# Patient Record
Sex: FEMALE | Race: WHITE | NOT HISPANIC OR LATINO | Employment: OTHER | URBAN - METROPOLITAN AREA
[De-identification: names, ages, dates, MRNs, and addresses within clinical notes are randomized per-mention and may not be internally consistent; named-entity substitution may affect disease eponyms.]

---

## 2017-01-24 ENCOUNTER — TELEPHONE (OUTPATIENT)
Dept: PHARMACY | Facility: CLINIC | Age: 67
End: 2017-01-24

## 2017-02-20 ENCOUNTER — TELEPHONE (OUTPATIENT)
Dept: PHARMACY | Facility: CLINIC | Age: 67
End: 2017-02-20

## 2017-03-17 ENCOUNTER — TELEPHONE (OUTPATIENT)
Dept: PHARMACY | Facility: CLINIC | Age: 67
End: 2017-03-17

## 2017-04-11 PROBLEM — Z79.899 LONG-TERM USE OF HIGH-RISK MEDICATION: Status: ACTIVE | Noted: 2017-04-11

## 2017-04-13 ENCOUNTER — TELEPHONE (OUTPATIENT)
Dept: PHARMACY | Facility: CLINIC | Age: 67
End: 2017-04-13

## 2017-05-12 ENCOUNTER — TELEPHONE (OUTPATIENT)
Dept: PHARMACY | Facility: CLINIC | Age: 67
End: 2017-05-12

## 2017-05-17 ENCOUNTER — TELEPHONE (OUTPATIENT)
Dept: PHARMACY | Facility: CLINIC | Age: 67
End: 2017-05-17

## 2017-06-09 ENCOUNTER — TELEPHONE (OUTPATIENT)
Dept: PHARMACY | Facility: CLINIC | Age: 67
End: 2017-06-09

## 2017-06-13 ENCOUNTER — TELEPHONE (OUTPATIENT)
Dept: PHARMACY | Facility: CLINIC | Age: 67
End: 2017-06-13

## 2017-07-07 ENCOUNTER — TELEPHONE (OUTPATIENT)
Dept: PHARMACY | Facility: CLINIC | Age: 67
End: 2017-07-07

## 2017-08-03 ENCOUNTER — TELEPHONE (OUTPATIENT)
Dept: PHARMACY | Facility: CLINIC | Age: 67
End: 2017-08-03

## 2017-09-01 ENCOUNTER — TELEPHONE (OUTPATIENT)
Dept: PHARMACY | Facility: CLINIC | Age: 67
End: 2017-09-01

## 2017-10-02 ENCOUNTER — TELEPHONE (OUTPATIENT)
Dept: PHARMACY | Facility: CLINIC | Age: 67
End: 2017-10-02

## 2017-11-03 ENCOUNTER — TELEPHONE (OUTPATIENT)
Dept: PHARMACY | Facility: CLINIC | Age: 67
End: 2017-11-03

## 2017-11-30 ENCOUNTER — TELEPHONE (OUTPATIENT)
Dept: PHARMACY | Facility: CLINIC | Age: 67
End: 2017-11-30

## 2017-12-26 ENCOUNTER — TELEPHONE (OUTPATIENT)
Dept: PHARMACY | Facility: CLINIC | Age: 67
End: 2017-12-26

## 2018-01-22 ENCOUNTER — TELEPHONE (OUTPATIENT)
Dept: PHARMACY | Facility: CLINIC | Age: 68
End: 2018-01-22

## 2018-02-19 ENCOUNTER — TELEPHONE (OUTPATIENT)
Dept: PHARMACY | Facility: CLINIC | Age: 68
End: 2018-02-19

## 2018-03-20 ENCOUNTER — TELEPHONE (OUTPATIENT)
Dept: PHARMACY | Facility: CLINIC | Age: 68
End: 2018-03-20

## 2018-04-19 ENCOUNTER — TELEPHONE (OUTPATIENT)
Dept: PHARMACY | Facility: CLINIC | Age: 68
End: 2018-04-19

## 2018-05-22 ENCOUNTER — TELEPHONE (OUTPATIENT)
Dept: PHARMACY | Facility: CLINIC | Age: 68
End: 2018-05-22

## 2018-05-24 NOTE — TELEPHONE ENCOUNTER
Patient reported during routine refill call that she had recently been prescribed meloxicam for knee pain by a local (non [facility]) provider.  There is also an xray pending results which may or may not produce a referral further depending on diagnosis.  Patient advised of the potential for additive toxicities with tenofovir and meloxicam and told that provider would be informed to evaluate same.     Daryl Palacio, TETE.Ph.  Clinical Pharmacist  [facility]  Phone: [phone]

## 2018-07-25 ENCOUNTER — TELEPHONE (OUTPATIENT)
Dept: PHARMACY | Facility: CLINIC | Age: 68
End: 2018-07-25

## 2018-08-23 ENCOUNTER — TELEPHONE (OUTPATIENT)
Dept: PHARMACY | Facility: CLINIC | Age: 68
End: 2018-08-23

## 2018-08-23 NOTE — TELEPHONE ENCOUNTER
Attempted to contact patient for refill/adherence screening Viread.  Unable to contact patient - LVM.

## 2018-10-02 ENCOUNTER — TELEPHONE (OUTPATIENT)
Dept: PHARMACY | Facility: CLINIC | Age: 68
End: 2018-10-02

## 2018-10-25 ENCOUNTER — TELEPHONE (OUTPATIENT)
Dept: PHARMACY | Facility: CLINIC | Age: 68
End: 2018-10-25

## 2018-10-29 NOTE — TELEPHONE ENCOUNTER
Patient called to refill Viread.  Patient confirmed she has 8-10 doses left she knows she will run out by next week.  Ship 11/1 for 11/2 delivery.  Copay $3.35 in 004.  Patient confirmed no new meds, allergies, or health conditions.  Verified address.  Declined [facility] . [place]

## 2018-11-23 ENCOUNTER — TELEPHONE (OUTPATIENT)
Dept: PHARMACY | Facility: CLINIC | Age: 68
End: 2018-11-23

## 2018-12-24 ENCOUNTER — TELEPHONE (OUTPATIENT)
Dept: PHARMACY | Facility: CLINIC | Age: 68
End: 2018-12-24

## 2018-12-24 NOTE — TELEPHONE ENCOUNTER
Refill call for Viread. No answer, lvm. Will follow up with patient if no return call.  Sent Parasc[employer] message.

## 2019-01-21 ENCOUNTER — TELEPHONE (OUTPATIENT)
Dept: PHARMACY | Facility: CLINIC | Age: 69
End: 2019-01-21

## 2019-01-21 NOTE — TELEPHONE ENCOUNTER
Tenofovir refill confirmed. We will ship tenofovir refill on  via fedex to arrive on . $3.40 copay- 004. Confirmed 2 patient identifiers - name and .     Patient has 8 doses of tenofovir remaining and takes it around breakfast daily.  Pt reports they are not having any side effects so far. No missed doses, no new medications, no new allergies or health conditions reported at this time.. All questions answered and addressed to patients satisfaction. Pt verbalized understanding.

## 2019-02-14 ENCOUNTER — TELEPHONE (OUTPATIENT)
Dept: PHARMACY | Facility: CLINIC | Age: 69
End: 2019-02-14

## 2019-03-19 ENCOUNTER — TELEPHONE (OUTPATIENT)
Dept: PHARMACY | Facility: CLINIC | Age: 69
End: 2019-03-19

## 2019-04-16 ENCOUNTER — TELEPHONE (OUTPATIENT)
Dept: PHARMACY | Facility: CLINIC | Age: 69
End: 2019-04-16

## 2019-04-23 NOTE — TELEPHONE ENCOUNTER
Tenofovir refill confirmed. We will ship tenofovir refill on  via fedex to arrive on . $3.40 copay- 004. Confirmed 2 patient identifiers - name and .     Patient has 5 doses of tenofovir remaining and takes it around breakfast daily.  Pt reports they are not having any side effects so far. No missed doses, no new medications, no new allergies or health conditions reported at this time.. All questions answered and addressed to patients satisfaction. Pt verbalized understanding.

## 2019-05-17 ENCOUNTER — TELEPHONE (OUTPATIENT)
Dept: PHARMACY | Facility: CLINIC | Age: 69
End: 2019-05-17

## 2019-05-17 NOTE — TELEPHONE ENCOUNTER
"Viread refill:  Viread refill confirmed. We will ship Viread refill on  via fedex to arrive on . $3.40 copay- 004. Confirmed 2 patient identifiers - name and .     Patient has 10 doses of Viread remaining and takes it daily.  Pt reports they are not having any side effects so far. She did report "maybe" missing 1 dose; otherwise no  mediation administration issues, no new medications, no new allergies or health conditions reported at this time. All questions answered and addressed to patients satisfaction. Pt verbalized understanding.          "

## 2019-06-18 ENCOUNTER — TELEPHONE (OUTPATIENT)
Dept: PHARMACY | Facility: CLINIC | Age: 69
End: 2019-06-18

## 2019-06-26 PROBLEM — K76.6 PORTAL HYPERTENSION: Status: ACTIVE | Noted: 2019-06-26

## 2019-07-16 ENCOUNTER — TELEPHONE (OUTPATIENT)
Dept: PHARMACY | Facility: CLINIC | Age: 69
End: 2019-07-16

## 2019-07-16 NOTE — TELEPHONE ENCOUNTER
Confirmed 2 patient identifiers - name and .     Patient reports receiving samples and having 1 month supply of medication on hand. Patient reports they are not having any side effects so far. No missed doses, no new medications, no new allergies or health conditions reported at this time. All questions answered and addressed to patients satisfaction. Patient verbalized understanding.

## 2019-08-06 ENCOUNTER — TELEPHONE (OUTPATIENT)
Dept: PHARMACY | Facility: CLINIC | Age: 69
End: 2019-08-06

## 2019-10-08 ENCOUNTER — TELEPHONE (OUTPATIENT)
Dept: PHARMACY | Facility: CLINIC | Age: 69
End: 2019-10-08

## 2019-10-11 NOTE — TELEPHONE ENCOUNTER
Refill and follow-up:   Viread refill confirmed. We will ship Viread refill on 10/14 via fedex to arrive on 10/15. $3.40 copay- 004. Confirmed 2 patient identifiers - name and .     Patient has 8 doses of Viread remaining and takes one (300mg) tablet daily.  Patient reports they are not having any side effects so far. No missed doses, no new medications, no new allergies or health conditions reported at this time. All questions answered and addressed to patient's satisfaction. Patient verbalized understanding.

## 2019-11-05 ENCOUNTER — TELEPHONE (OUTPATIENT)
Dept: PHARMACY | Facility: CLINIC | Age: 69
End: 2019-11-05

## 2019-11-05 NOTE — TELEPHONE ENCOUNTER
RX call regarding Viread from [facility]. Patient reached-- stated she was not home and asked if we could give her a call back tomorrow 11/6 for 4pm.. Will pend accordingly to patients request..  (copay 3.40)

## 2019-12-10 ENCOUNTER — TELEPHONE (OUTPATIENT)
Dept: PHARMACY | Facility: CLINIC | Age: 69
End: 2019-12-10

## 2020-01-17 ENCOUNTER — TELEPHONE (OUTPATIENT)
Dept: PHARMACY | Facility: CLINIC | Age: 70
End: 2020-01-17

## 2020-01-23 NOTE — TELEPHONE ENCOUNTER
Refill readiness for Viread confirmed with patient; name/ confirmed; no missed doses; no new medications; no side effects noted; address confirmed for  shipment and  delivery.  Patient states they have 10 doses remaining.

## 2020-02-21 ENCOUNTER — TELEPHONE (OUTPATIENT)
Dept: PHARMACY | Facility: CLINIC | Age: 70
End: 2020-02-21

## 2020-03-02 NOTE — TELEPHONE ENCOUNTER
RX call attempt 3 regarding Viread refill from [facility]. Patient reached-- Shipping out 3/5 for 3/6 arrival with patients consent. Copay of $3.60 charging the Credit patient has in the register @ 004 [Patient will now have a credit of $92.80]. Address and  confirmed. Patient has 8 days of medication on hand at this time. Patient has not started any new medications, has had no missed doses and no side effects present. Patient is currently taking the medication as directed by doctors instruction, Take 1 tablet (300 mg total) by mouth once daily. Patient does have a safe place in their residence to keep medication at desired temperature away from small children and pets. Patient also does have the capability of contacting 911 in the event of an emergency. Patient states they do not have any questions or concerns at this time.

## 2020-04-01 ENCOUNTER — TELEPHONE (OUTPATIENT)
Dept: PHARMACY | Facility: CLINIC | Age: 70
End: 2020-04-01

## 2020-04-08 ENCOUNTER — TELEPHONE (OUTPATIENT)
Dept: PHARMACY | Facility: CLINIC | Age: 70
End: 2020-04-08

## 2020-04-08 NOTE — TELEPHONE ENCOUNTER
Tenofovir refill confirmed and reassessment complete. We will ship tenofovir refill on  via fedex to arrive on . $3.60 copay- 004. Confirmed 2 patient identifiers - name and . Therapy appropriate.     Patient has 7 doses of TDF remaining and takes it around breakfast daily.  Pt reports they are not having any side effects so far. No missed doses, no new medications, no new allergies or health conditions reported at this time. Allergies reviewed and medication reconciliation complete (reviewed and documented in [facility]).  Disease education reviewed (including transmission and prevention). Patient counseled on importance of maintaining adherence and keeping lab appointments which were scheduled. All questions answered and addressed to patients satisfaction. Advised to call [facility] and provider if any issues arise.  Pt verbalized understanding.

## 2020-05-05 ENCOUNTER — TELEPHONE (OUTPATIENT)
Dept: PHARMACY | Facility: CLINIC | Age: 70
End: 2020-05-05

## 2020-06-09 ENCOUNTER — TELEPHONE (OUTPATIENT)
Dept: PHARMACY | Facility: CLINIC | Age: 70
End: 2020-06-09

## 2020-06-09 NOTE — TELEPHONE ENCOUNTER
Refill call regarding Viread from [facility]. Shipping out Viread on  for  arrival with patients consent. Copay of $3.60 (AR CREDIT) @ 004. Address and  confirmed.

## 2020-07-06 ENCOUNTER — HISTORICAL (OUTPATIENT)
Dept: ADMINISTRATIVE | Facility: HOSPITAL | Age: 70
End: 2020-07-06

## 2020-07-06 LAB
ALBUMIN SERPL BCP-MCNC: 3.5 G/DL (ref 3.5–5)
ALBUMIN/GLOB SERPL ELPH: 0.9 {RATIO} (ref 1.5–2.2)
ALP SERPL-CCNC: 65 U/L (ref 45–117)
ALT SERPL W P-5'-P-CCNC: 18 U/L (ref 13–56)
ANION GAP SERPL CALC-SCNC: 3.2 MEQ/L (ref 10–20)
AST SERPL-CCNC: 15 U/L (ref 15–37)
BILIRUB SERPL-MCNC: 0.49 MG/DL (ref 0.2–1)
BUN SERPL-MCNC: 21 MG/DL (ref 7–18)
CALCIUM SERPL-MCNC: 8.5 MG/DL (ref 8.5–10.1)
CHLORIDE SERPL-SCNC: 109 MMOL/L (ref 98–107)
CO2 SERPL-SCNC: 32 MMOL/L (ref 22–32)
CREAT SERPL-MCNC: 0.59 MG/DL (ref 0.55–1.02)
EGFR: 107 ML/MIN/1.73M
ERYTHROCYTE [DISTWIDTH] IN BLOOD BY AUTOMATED COUNT: 12.7 % (ref 11.5–14.5)
GLOBULIN: 3.8 G/DL (ref 2.3–3.5)
GLUCOSE SERPL-MCNC: 95 MG/DL (ref 70–99)
HCT VFR BLD AUTO: 44.9 % (ref 37.7–47.9)
HGB BLD-MCNC: 14.7 G/DL (ref 12.2–16.2)
MCH RBC QN AUTO: 30.2 PG (ref 27–31)
MCHC RBC AUTO-ENTMCNC: 32.7 G% (ref 32–35)
MCV RBC AUTO: 92.4 FL (ref 80–97)
OSMOC: 282 MOSM/KG (ref 275–295)
PMV BLD AUTO: 10 FL (ref 7.4–10.4)
PMV BLD AUTO: 255 10 (ref 142–424)
POTASSIUM SERPL-SCNC: 4.2 MMOL/L (ref 3.5–5.1)
PROT SERPL-MCNC: 7.3 G/DL (ref 6.4–8.2)
RBC # BLD AUTO: 4.86 M/UL (ref 4.04–5.48)
SODIUM BLD-SCNC: 140 MMOL/L (ref 136–145)
WBC # BLD AUTO: 7.6 10 (ref 4.6–10.2)

## 2020-07-14 ENCOUNTER — TELEPHONE (OUTPATIENT)
Dept: PHARMACY | Facility: CLINIC | Age: 70
End: 2020-07-14

## 2020-07-14 NOTE — TELEPHONE ENCOUNTER
Refill call attempt 1 regarding Viread at [facility].  Called patient with no answer on either contact, LVM, MyChart status declined.  Copay $3.60.  Patient should currently have a AR Credit of $82.00.

## 2020-08-12 ENCOUNTER — TELEPHONE (OUTPATIENT)
Dept: PHARMACY | Facility: CLINIC | Age: 70
End: 2020-08-12

## 2020-09-15 ENCOUNTER — TELEPHONE (OUTPATIENT)
Dept: PHARMACY | Facility: CLINIC | Age: 70
End: 2020-09-15

## 2020-09-22 ENCOUNTER — LAB VISIT (OUTPATIENT)
Dept: LAB | Facility: HOSPITAL | Age: 70
End: 2020-09-22
Attending: NURSE PRACTITIONER
Payer: MEDICARE

## 2020-09-22 DIAGNOSIS — Z13.220 SCREENING FOR LIPOID DISORDERS: ICD-10-CM

## 2020-09-22 DIAGNOSIS — E03.9 MYXEDEMA HEART DISEASE: Primary | ICD-10-CM

## 2020-09-22 DIAGNOSIS — M81.8 IDIOPATHIC OSTEOPOROSIS: ICD-10-CM

## 2020-09-22 DIAGNOSIS — E55.0 RICKETS, ACTIVE: ICD-10-CM

## 2020-09-22 DIAGNOSIS — I51.9 MYXEDEMA HEART DISEASE: Primary | ICD-10-CM

## 2020-09-22 LAB
25(OH)D3+25(OH)D2 SERPL-MCNC: 31 NG/ML (ref 30–96)
ALBUMIN SERPL BCP-MCNC: 3.7 G/DL (ref 3.5–5.2)
ALP SERPL-CCNC: 91 U/L (ref 55–135)
ALT SERPL W/O P-5'-P-CCNC: 13 U/L (ref 10–44)
ANION GAP SERPL CALC-SCNC: 3 MMOL/L (ref 8–16)
AST SERPL-CCNC: 6 U/L (ref 10–40)
BILIRUB SERPL-MCNC: 0.4 MG/DL (ref 0.1–1)
BUN SERPL-MCNC: 19 MG/DL (ref 8–23)
CALCIUM SERPL-MCNC: 8.5 MG/DL (ref 8.7–10.5)
CHLORIDE SERPL-SCNC: 107 MMOL/L (ref 95–110)
CHOLEST SERPL-MCNC: 160 MG/DL (ref 120–199)
CHOLEST/HDLC SERPL: 3.8 {RATIO} (ref 2–5)
CO2 SERPL-SCNC: 31 MMOL/L (ref 23–29)
CREAT SERPL-MCNC: 0.5 MG/DL (ref 0.5–1.4)
EST. GFR  (AFRICAN AMERICAN): >60 ML/MIN/1.73 M^2
EST. GFR  (NON AFRICAN AMERICAN): >60 ML/MIN/1.73 M^2
GLUCOSE SERPL-MCNC: 95 MG/DL (ref 70–110)
HDLC SERPL-MCNC: 42 MG/DL (ref 40–75)
HDLC SERPL: 26.3 % (ref 20–50)
LDLC SERPL CALC-MCNC: 95.6 MG/DL (ref 63–159)
NONHDLC SERPL-MCNC: 118 MG/DL
POTASSIUM SERPL-SCNC: 4.2 MMOL/L (ref 3.5–5.1)
PROT SERPL-MCNC: 7.4 G/DL (ref 6–8.4)
SODIUM SERPL-SCNC: 141 MMOL/L (ref 136–145)
TRIGL SERPL-MCNC: 112 MG/DL (ref 30–150)
TSH SERPL DL<=0.005 MIU/L-ACNC: 0.62 UIU/ML (ref 0.4–4)

## 2020-09-22 PROCEDURE — 82306 VITAMIN D 25 HYDROXY: CPT

## 2020-09-22 PROCEDURE — 36415 COLL VENOUS BLD VENIPUNCTURE: CPT

## 2020-09-22 PROCEDURE — 80061 LIPID PANEL: CPT

## 2020-09-22 PROCEDURE — 80053 COMPREHEN METABOLIC PANEL: CPT

## 2020-09-22 PROCEDURE — 84443 ASSAY THYROID STIM HORMONE: CPT

## 2020-09-22 NOTE — TELEPHONE ENCOUNTER
Refill call regarding Viread at [facility].  Will prepare for shipment with patient consent on  to arrive .  Copay $3.60 (AR credit on file) Patient has 10 days on hand.  Patient has not started any new medications including OTC drugs. Patient has not had any medication/ dose or instruction changes. No new allergies or side effects reported with this shipment. Medication is being taken as prescribed by physician and properly stored. Two patient identifiers:  and Address verified. Patient has no questions or concerns for [facility].

## 2020-09-28 ENCOUNTER — TELEPHONE (OUTPATIENT)
Dept: PHARMACY | Facility: CLINIC | Age: 70
End: 2020-09-28

## 2020-09-28 NOTE — TELEPHONE ENCOUNTER
Viread F/U attempted (attempt 1). NA, LVM for call back. Will f/u if no call back.   - Remind patient of upcoming appts on 11/17/20 for US, labs, and f/u visit.

## 2020-10-22 ENCOUNTER — SPECIALTY PHARMACY (OUTPATIENT)
Dept: PHARMACY | Facility: CLINIC | Age: 70
End: 2020-10-22

## 2020-10-22 DIAGNOSIS — B18.1 CHRONIC HEPATITIS B: Primary | ICD-10-CM

## 2020-10-22 NOTE — TELEPHONE ENCOUNTER
Viread refill and reassessment attempted (attempt 1). Patient not at home and unavailable to complete follow up or confirm on hand counts at this time. Patient requests call back on 10/23/2020 after 12pm when she will be home. She confirms she has enough medication to last for at least another week and will not miss doses if the medication is not shipped today.

## 2020-10-23 RX ORDER — NAPROXEN SODIUM 220 MG/1
81 TABLET, FILM COATED ORAL DAILY
COMMUNITY

## 2020-10-23 RX ORDER — CHOLECALCIFEROL (VITAMIN D3) 50 MCG
1 TABLET ORAL DAILY
COMMUNITY

## 2020-10-23 RX ORDER — AMOXICILLIN 500 MG
2 CAPSULE ORAL DAILY
COMMUNITY

## 2020-10-23 NOTE — TELEPHONE ENCOUNTER
Specialty Pharmacy - Clinical Reassessment  Specialty Pharmacy - Refill Coordination    Specialty Medication Orders Linked to Encounter      Most Recent Value   Medication #1  tenofovir (VIREAD) 300 mg Tab (Order#[id number], Rx#[id number])        Koffi Sanchez is a 70 y.o. female, who is followed by the specialty pharmacy service for management and education.    Encounters since last clinical assessment   No encounters found.   Clinical call attempts since last clinical assessment   10/22/2020  8:44 PM - Specialty Pharmacy - Clinical Reassessment by Alyssa Polo PharmD     Today she received follow up education for her specialty medication(s).    Current Outpatient Medications   Medication Sig    aspirin 81 MG Chew Take 81 mg by mouth once daily.    cetirizine (ZYRTEC) 10 MG tablet Take 10 mg by mouth once daily.    cholecalciferol, vitamin D3, (VITAMIN D3) 50 mcg (2,000 unit) Tab Take 1 tablet by mouth once daily.    dexlansoprazole (DEXILANT) 60 mg capsule Take 60 mg by mouth once daily.    fluticasone (FLONASE) 50 mcg/actuation nasal spray 1 spray by Each Nostril route daily as needed.    levothyroxine (SYNTHROID) 75 MCG tablet Take 1 tablet by mouth once daily.    omega-3 fatty acids/fish oil (FISH OIL-OMEGA-3 FATTY ACIDS) 300-1,000 mg capsule Take 2 capsules by mouth once daily.    tenofovir (VIREAD) 300 mg Tab Take 1 tablet (300 mg total) by mouth once daily.   Last reviewed on 10/23/2020  1:59 PM by Alyssa Polo PharmD    Review of patient's allergies indicates:  No Known AllergiesLast reviewed on  10/23/2020 1:38 PM by Alyssa Polo    Drug Interactions    Drug interactions evaluated: yes  Clinically relevant drug interactions identified: no  Provided the patient with educational material regarding drug interactions: yes   Educational material comments: Patient knows to reach out to [facility] if any changes to her medications.         Medication Adherence    What concerns does the patient  have in regards to their medications: Patient reports she's usually very good about remembering her dose at lunchtime every day. She missed one dose due to forgetting, but doesn't think she needs any reminder tools such as pill box, alarm, or assistance from her daughter. Her fill history suggests she always fills a few days late, yet patient reports we always fill early and she had a small stock pile. Patient due for refill on 10/28/20 per refill Hx, yet patient reports she has 14 tablets on hand. Will continue to follow up on adherence at future calls.   Patient reported X missed doses in the last month: 1  Any gaps in refill history greater than 2 weeks in the last 3 months: no  Demonstrates understanding of importance of adherence: yes  Informant: patient  Reliability of informant: fairly reliable  Provider-estimated medication adherence level: %  Reasons for non-adherence: patient forgets  Adherence tools used: directed education  Refills needed for supportive medications: not needed       Adverse Effects    Arthralgias: Pos  Joint swelling: Pos       Assessment Questions - Documented Responses      Most Recent Value   Assessment   Medication Reconciliation completed for patient  Yes   During the past 4 weeks, has patient missed any activities due to condition or medication?  No   During the past 4 weeks, did patient have any of the following urgent care visits?  None   Goals of Therapy Status  Achieving   Welcome packet contents reviewed and discussed with patient?  No   Assesment completed?  Yes   Plan  Therapy continued   Do you need to open a clinical intervention (i-vent)?  No   Do you want to schedule first shipment?  No   Medication #1 Assessment Info   Patient status  Existing medication, Exisiting to [facility]   Is this medication appropriate for the patient?  Yes   Is this medication effective?  Yes          Objective    She has a past medical history of GERD (gastroesophageal reflux disease),  "Hepatitis (2005), and Thyroid disease.    Tried/failed medications: none    BP Readings from Last 4 Encounters:   05/19/20 138/86   10/15/19 (!) 142/73   06/26/19 125/78   04/16/19 (!) 145/77     Ht Readings from Last 4 Encounters:   05/19/20 4' 11" (1.499 m)   10/15/19 4' 11" (1.499 m)   06/26/19 4' 11" (1.499 m)   04/16/19 4' 11" (1.499 m)     Wt Readings from Last 4 Encounters:   05/19/20 68 kg (149 lb 14.6 oz)   10/15/19 62 kg (136 lb 11 oz)   06/26/19 60.8 kg (134 lb)   04/16/19 59.5 kg (131 lb 2.8 oz)     Recent Labs   Lab Result Units 09/22/20  0939   Creatinine mg/dL 0.5   ALT U/L 13   AST U/L 6 L     The goals of prescribed drug therapy management include:  · Supporting patient to meet the prescriber's medical treatment objectives  · Improving or maintaining quality of life  · Maintaining optimal therapy adherence  · Minimizing and managing side effects      Goals of Therapy Status: Achieving    Assessment/Plan  Patient plans to continue therapy without changes      Indication, dosage, appropriateness, effectiveness, safety and convenience of her specialty medication(s) were reviewed today.     Patient Counseling    Counseled the patient on the following: doses and administration discussed, safe handling, storage, and disposal discussed, possible adverse effects and management discussed, possible drug and prescription drug interactions discussed, possible drug and OTC drug and food interactions discussed, lab monitoring and follow-up discussed, use of contraception discussed, therapeutic rationale discussed, cost of medications and cost implications discussed, adherence and missed doses discussed, pharmacy contact information discussed       - Patient aware she has a f/u appointment scheduled in November to check HBV VL.  - Patient recently had total knee replacement and is experiencing some mild pain related to the surgery. Reports she ices and elevates her knee when able, and we discussed using low dose " NSAIDs as long as she is not taking any other medication for pain. She will discuss this with her provider and/or at her next PT session.   - Patient admits she missed one dose over the past month, but fill history suggests she regularly misses a few days per month. Will reinforce adherence at next call.   - She denies any side effects such as nausea, headache, or rash. She denies any S/Sx of infection post-surgery. No further questions/concerns at this time.    Tasks added this encounter   4/14/2021 - Clinical - Follow Up Assesement (180 day)   Tasks due within next 3 months   10/29/2020 - Refill Call     Alyssa Polo, Paulina  [facility]  [address] [place] [id number]  Phone: [phone]  Fax: [phone]

## 2020-10-31 ENCOUNTER — SPECIALTY PHARMACY (OUTPATIENT)
Dept: PHARMACY | Facility: CLINIC | Age: 70
End: 2020-10-31

## 2020-10-31 DIAGNOSIS — B18.1 CHRONIC HEPATITIS B: Primary | ICD-10-CM

## 2020-10-31 NOTE — TELEPHONE ENCOUNTER
Called patient to assess refill readiness. She reports she only has 5 tablets left and is ready for a refill to be shipped. A refill request for Viread was sent to the provider during our previous conversation, but the RX has not been received yet. A duplicate refill request was sent and the patient plans to call the [facility] on Monday to inform them that this is an urgent request. [facility] to f/u as soon as RX has been received.

## 2020-11-02 ENCOUNTER — SPECIALTY PHARMACY (OUTPATIENT)
Dept: PHARMACY | Facility: CLINIC | Age: 70
End: 2020-11-02

## 2020-11-05 NOTE — TELEPHONE ENCOUNTER
Specialty Pharmacy - Refill Coordination    Specialty Medication Orders Linked to Encounter      Most Recent Value   Medication #1  tenofovir (VIREAD) 300 mg Tab (Order#[id number], Rx#[id number])          Refill Questions - Documented Responses      Most Recent Value   Relationship to patient of person spoken to?  Self   HIPAA/medical authority confirmed?  Yes   Any changes in contact preferences or allowed representatives?  No   Has the patient had any insurance changes?  No   Has the patient had any changes to specialty medication, dose, or instructions?  No   Has the patient started taking any new medications, herbals, or supplements?  No   Has the patient been diagnosed with any new medical conditions?  No   Does the patient have any new allergies to medications or foods?  No   Does the patient have any concerns about side effects?  No   Can the patient store medication/sharps container properly (at the correct temperature, away from children/pets, etc.)?  Yes   Can the patient call emergency services (911) in the event of an emergency?  Yes   Does the patient have any concerns or questions about taking or administering this medication as prescribed?  No   How many doses did the patient miss in the past 4 weeks or since the last fill?  0   How many doses does the patient have on hand?  3   How many days does the patient report on hand quantity will last?  3   Does the number of doses/days supply remaining match pharmacy expected amounts?  Yes   How will the patient receive the medication?  Mail   When does the patient need to receive the medication?  11/06/20   Shipping Address  Home   Address in [facility] confirmed and updated if neccessary?  Yes   Expected Copay ($)  3.6   Is the patient able to afford the medication copay?  Yes   Payment Method  (!) invoice (approval required)   Days supply of Refill  30   Would patient like to speak to a pharmacist?  No   Do you want to trigger an intervention?  No    Do you want to trigger an additional referral task?  No   Refill activity completed?  Yes   Refill activity plan  Refill scheduled   Shipment/Pickup Date:  11/05/20          Current Outpatient Medications   Medication Sig    aspirin 81 MG Chew Take 81 mg by mouth once daily.    cetirizine (ZYRTEC) 10 MG tablet Take 10 mg by mouth once daily.    cholecalciferol, vitamin D3, (VITAMIN D3) 50 mcg (2,000 unit) Tab Take 1 tablet by mouth once daily.    dexlansoprazole (DEXILANT) 60 mg capsule Take 60 mg by mouth once daily.    fluticasone (FLONASE) 50 mcg/actuation nasal spray 1 spray by Each Nostril route daily as needed.    levothyroxine (SYNTHROID) 75 MCG tablet Take 1 tablet by mouth once daily.    omega-3 fatty acids/fish oil (FISH OIL-OMEGA-3 FATTY ACIDS) 300-1,000 mg capsule Take 2 capsules by mouth once daily.    tenofovir (VIREAD) 300 mg Tab Take 1 tablet (300 mg total) by mouth once daily.   Last reviewed on 11/5/2020  9:58 AM by Kathleen Yarbrough    Review of patient's allergies indicates:  No Known Allergies Last reviewed on  11/5/2020 9:58 AM by Kathleen Yarbrough      Tasks added this encounter   No tasks added.   Tasks due within next 3 months   10/29/2020 - Refill Call     Kathleen Yarbrough  [facility]  [address] [place] [id number]  Phone: [phone]  Fax: [phone]

## 2020-12-04 ENCOUNTER — SPECIALTY PHARMACY (OUTPATIENT)
Dept: PHARMACY | Facility: CLINIC | Age: 70
End: 2020-12-04

## 2020-12-04 NOTE — TELEPHONE ENCOUNTER
Specialty Pharmacy - Refill Coordination    Specialty Medication Orders Linked to Encounter      Most Recent Value   Medication #1  tenofovir (VIREAD) 300 mg Tab (Order#[id number], Rx#[id number])          Refill Questions - Documented Responses      Most Recent Value   Relationship to patient of person spoken to?  Self   HIPAA/medical authority confirmed?  Yes   Any changes in contact preferences or allowed representatives?  No   Has the patient had any insurance changes?  No   Has the patient had any changes to specialty medication, dose, or instructions?  No   Has the patient started taking any new medications, herbals, or supplements?  No   Has the patient been diagnosed with any new medical conditions?  No   Does the patient have any new allergies to medications or foods?  No   Can the patient store medication/sharps container properly (at the correct temperature, away from children/pets, etc.)?  Yes   Can the patient call emergency services (911) in the event of an emergency?  Yes   Does the patient have any concerns or questions about taking or administering this medication as prescribed?  No   How many doses did the patient miss in the past 4 weeks or since the last fill?  0   How many doses does the patient have on hand?  8   How many days does the patient report on hand quantity will last?  8   Does the number of doses/days supply remaining match pharmacy expected amounts?  Yes   Does the patient feel that this medication is effective?  Yes   How will the patient receive the medication?  Mail   When does the patient need to receive the medication?  12/13/20   Shipping Address  Home   Address in [facility] confirmed and updated if neccessary?  Yes   Expected Copay ($)  3.6   Is the patient able to afford the medication copay?  Yes   Payment Method  zero copay   Days supply of Refill  30   Would patient like to speak to a pharmacist?  No   Do you want to trigger an intervention?  No   Do you want to  trigger an additional referral task?  No   Refill activity completed?  Yes   Refill activity plan  Refill scheduled   Shipment/Pickup Date:  12/09/20          Current Outpatient Medications   Medication Sig    aspirin 81 MG Chew Take 81 mg by mouth once daily.    cetirizine (ZYRTEC) 10 MG tablet Take 10 mg by mouth once daily.    cholecalciferol, vitamin D3, (VITAMIN D3) 50 mcg (2,000 unit) Tab Take 1 tablet by mouth once daily.    dexlansoprazole (DEXILANT) 60 mg capsule Take 60 mg by mouth once daily.    fluticasone (FLONASE) 50 mcg/actuation nasal spray 1 spray by Each Nostril route daily as needed.    levothyroxine (SYNTHROID) 75 MCG tablet Take 1 tablet by mouth once daily.    omega-3 fatty acids/fish oil (FISH OIL-OMEGA-3 FATTY ACIDS) 300-1,000 mg capsule Take 2 capsules by mouth once daily.    tenofovir (VIREAD) 300 mg Tab Take 1 tablet (300 mg total) by mouth once daily.   Last reviewed on 12/4/2020 12:41 PM by Kathleen Yarbrough    Review of patient's allergies indicates:  No Known Allergies Last reviewed on  12/4/2020 12:41 PM by Kathleen Yarbrough      Tasks added this encounter   No tasks added.   Tasks due within next 3 months   11/28/2020 - Refill Call (Auto Added)     Kathleen Yarbrough  [facility] - Specialty Pharm[facility]  [address] [place] [id number]  Phone: [phone]  Fax: [phone]

## 2021-01-06 ENCOUNTER — TELEPHONE (OUTPATIENT)
Dept: PHARMACY | Facility: CLINIC | Age: 71
End: 2021-01-06

## 2021-01-11 ENCOUNTER — TELEPHONE (OUTPATIENT)
Dept: PHARMACY | Facility: CLINIC | Age: 71
End: 2021-01-11

## 2021-01-12 ENCOUNTER — SPECIALTY PHARMACY (OUTPATIENT)
Dept: PHARMACY | Facility: CLINIC | Age: 71
End: 2021-01-12

## 2021-01-27 DIAGNOSIS — Z12.31 ENCOUNTER FOR SCREENING MAMMOGRAM FOR BREAST CANCER: Primary | ICD-10-CM

## 2021-01-28 ENCOUNTER — HOSPITAL ENCOUNTER (OUTPATIENT)
Dept: RADIOLOGY | Facility: HOSPITAL | Age: 71
Discharge: HOME | End: 2021-01-28
Attending: NURSE PRACTITIONER
Payer: MEDICARE

## 2021-01-28 VITALS — BODY MASS INDEX: 28.47 KG/M2 | HEIGHT: 60 IN | WEIGHT: 145 LBS

## 2021-01-28 DIAGNOSIS — Z12.31 ENCOUNTER FOR SCREENING MAMMOGRAM FOR BREAST CANCER: ICD-10-CM

## 2021-01-28 PROCEDURE — 77067 SCR MAMMO BI INCL CAD: CPT | Mod: TC

## 2021-02-09 ENCOUNTER — SPECIALTY PHARMACY (OUTPATIENT)
Dept: PHARMACY | Facility: CLINIC | Age: 71
End: 2021-02-09

## 2021-03-09 ENCOUNTER — SPECIALTY PHARMACY (OUTPATIENT)
Dept: PHARMACY | Facility: CLINIC | Age: 71
End: 2021-03-09

## 2021-03-19 ENCOUNTER — TELEPHONE (OUTPATIENT)
Dept: PHARMACY | Facility: CLINIC | Age: 71
End: 2021-03-19

## 2021-04-07 ENCOUNTER — SPECIALTY PHARMACY (OUTPATIENT)
Dept: PHARMACY | Facility: CLINIC | Age: 71
End: 2021-04-07

## 2021-04-14 ENCOUNTER — TELEPHONE (OUTPATIENT)
Dept: PHARMACY | Facility: CLINIC | Age: 71
End: 2021-04-14

## 2021-05-07 ENCOUNTER — SPECIALTY PHARMACY (OUTPATIENT)
Dept: PHARMACY | Facility: CLINIC | Age: 71
End: 2021-05-07

## 2021-05-26 ENCOUNTER — HOSPITAL ENCOUNTER (OUTPATIENT)
Dept: RADIOLOGY | Facility: HOSPITAL | Age: 71
Discharge: HOME | End: 2021-05-26
Attending: NURSE PRACTITIONER
Payer: MEDICARE

## 2021-05-26 DIAGNOSIS — J06.9 ACUTE UPPER RESPIRATORY INFECTION: ICD-10-CM

## 2021-05-26 DIAGNOSIS — J06.9 ACUTE UPPER RESPIRATORY INFECTION: Primary | ICD-10-CM

## 2021-05-26 PROCEDURE — 71046 X-RAY EXAM CHEST 2 VIEWS: CPT | Mod: TC

## 2021-06-04 ENCOUNTER — SPECIALTY PHARMACY (OUTPATIENT)
Dept: PHARMACY | Facility: CLINIC | Age: 71
End: 2021-06-04

## 2021-07-02 ENCOUNTER — SPECIALTY PHARMACY (OUTPATIENT)
Dept: PHARMACY | Facility: CLINIC | Age: 71
End: 2021-07-02

## 2021-07-30 ENCOUNTER — SPECIALTY PHARMACY (OUTPATIENT)
Dept: PHARMACY | Facility: CLINIC | Age: 71
End: 2021-07-30

## 2021-09-08 ENCOUNTER — SPECIALTY PHARMACY (OUTPATIENT)
Dept: PHARMACY | Facility: CLINIC | Age: 71
End: 2021-09-08

## 2021-10-01 ENCOUNTER — SPECIALTY PHARMACY (OUTPATIENT)
Dept: PHARMACY | Facility: CLINIC | Age: 71
End: 2021-10-01

## 2021-11-01 ENCOUNTER — SPECIALTY PHARMACY (OUTPATIENT)
Dept: PHARMACY | Facility: CLINIC | Age: 71
End: 2021-11-01
Payer: MEDICARE

## 2021-11-01 DIAGNOSIS — B18.1 CHRONIC HEPATITIS B: Primary | ICD-10-CM

## 2021-11-26 ENCOUNTER — SPECIALTY PHARMACY (OUTPATIENT)
Dept: PHARMACY | Facility: CLINIC | Age: 71
End: 2021-11-26
Payer: MEDICARE

## 2021-12-21 ENCOUNTER — HOSPITAL ENCOUNTER (OUTPATIENT)
Dept: RADIOLOGY | Facility: HOSPITAL | Age: 71
Discharge: HOME | End: 2021-12-21
Attending: PHYSICIAN ASSISTANT
Payer: MEDICARE

## 2021-12-21 DIAGNOSIS — Z79.899 LONG-TERM USE OF HIGH-RISK MEDICATION: ICD-10-CM

## 2021-12-21 DIAGNOSIS — B18.1 CHRONIC HEPATITIS B: ICD-10-CM

## 2021-12-21 DIAGNOSIS — K76.6 PORTAL HYPERTENSION: ICD-10-CM

## 2021-12-21 PROCEDURE — 76705 ECHO EXAM OF ABDOMEN: CPT | Mod: TC

## 2021-12-27 ENCOUNTER — SPECIALTY PHARMACY (OUTPATIENT)
Dept: PHARMACY | Facility: CLINIC | Age: 71
End: 2021-12-27
Payer: MEDICARE

## 2022-01-07 DIAGNOSIS — Z12.31 ENCOUNTER FOR SCREENING MAMMOGRAM FOR MALIGNANT NEOPLASM OF BREAST: Primary | ICD-10-CM

## 2022-01-07 DIAGNOSIS — M81.0 POSTMENOPAUSAL OSTEOPOROSIS: Primary | ICD-10-CM

## 2022-01-22 ENCOUNTER — SPECIALTY PHARMACY (OUTPATIENT)
Dept: PHARMACY | Facility: CLINIC | Age: 72
End: 2022-01-22
Payer: MEDICARE

## 2022-01-22 NOTE — TELEPHONE ENCOUNTER
Specialty Pharmacy - Refill Coordination    Specialty Medication Orders Linked to Encounter    Flowsheet Row Most Recent Value   Medication #1 tenofovir disoproxil fumarate (VIREAD) 300 mg Tab (Order#[id number], Rx#[id number])          Refill Questions - Documented Responses    Flowsheet Row Most Recent Value   Patient Availability and HIPAA Verification    Does patient want to proceed with activity? Yes   HIPAA/medical authority confirmed? Yes   Relationship to patient of person spoken to? Self   Refill Screening Questions    Changes to allergies? No   Changes to medications? No   New conditions since last clinic visit? No   Unplanned office visit, urgent care, ED, or hospital admission in the last 4 weeks? No   How does patient/caregiver feel medication is working? Good   Financial problems or insurance changes? No   How many doses of your specialty medications were missed in the last 4 weeks? 0   Would patient like to speak to a pharmacist? No   When does the patient need to receive the medication? 01/29/22   Refill Delivery Questions    How will the patient receive the medication? Delivery Millie   When does the patient need to receive the medication? 01/29/22   Shipping Address Prescription   Address in [facility] confirmed and updated if neccessary? Yes   Expected Copay ($) 0   Is the patient able to afford the medication copay? Yes   Payment Method zero copay   Days supply of Refill 30   Supplies needed? No supplies needed   Refill activity completed? Yes   Refill activity plan Refill scheduled   Shipment/Pickup Date: 01/27/22          Current Outpatient Medications   Medication Sig    aspirin 81 MG Chew Take 81 mg by mouth once daily.    cetirizine (ZYRTEC) 10 MG tablet Take 10 mg by mouth once daily.    cholecalciferol, vitamin D3, (VITAMIN D3) 50 mcg (2,000 unit) Tab Take 1 tablet by mouth once daily.    dexlansoprazole (DEXILANT) 60 mg capsule Take 60 mg by mouth once daily.    fluticasone  (FLONASE) 50 mcg/actuation nasal spray 1 spray by Each Nostril route daily as needed.    levothyroxine (SYNTHROID) 75 MCG tablet Take 1 tablet by mouth once daily.    lisinopriL 10 MG tablet TAKE 1 TABLET ORALLY ONCE A DAY.    loratadine (CLARITIN) 10 mg tablet 1 tablet    omega-3 fatty acids/fish oil (FISH OIL-OMEGA-3 FATTY ACIDS) 300-1,000 mg capsule Take 2 capsules by mouth once daily.    PROLIA 60 mg/mL Syrg SMARTSI Milligram(s) SUB-Q Twice a Year    tenofovir disoproxil fumarate (VIREAD) 300 mg Tab Take 1 tablet (300 mg total) by mouth once daily.   Last reviewed on 2021  9:45 AM by Tej Ferrara MA    Review of patient's allergies indicates:  No Known Allergies Last reviewed on  2021 9:43 AM by Tej Ferrara      Tasks added this encounter   2022 - Refill Call (Auto Added)   Tasks due within next 3 months   No tasks due.     Dione Padua Esguerra Jeff [facility] - Specialty Pharm[facility]  [address] [facility] [address]  Phone: [phone]  Fax: [phone]

## 2022-01-31 ENCOUNTER — HOSPITAL ENCOUNTER (OUTPATIENT)
Dept: RADIOLOGY | Facility: HOSPITAL | Age: 72
Discharge: HOME | End: 2022-01-31
Attending: NURSE PRACTITIONER
Payer: MEDICARE

## 2022-01-31 VITALS — WEIGHT: 152 LBS | BODY MASS INDEX: 29.84 KG/M2 | HEIGHT: 60 IN

## 2022-01-31 DIAGNOSIS — Z12.31 ENCOUNTER FOR SCREENING MAMMOGRAM FOR MALIGNANT NEOPLASM OF BREAST: ICD-10-CM

## 2022-01-31 DIAGNOSIS — M81.0 POSTMENOPAUSAL OSTEOPOROSIS: ICD-10-CM

## 2022-01-31 PROCEDURE — 77080 DXA BONE DENSITY AXIAL: CPT | Mod: TC

## 2022-01-31 PROCEDURE — 77067 SCR MAMMO BI INCL CAD: CPT | Mod: TC

## 2022-02-21 ENCOUNTER — SPECIALTY PHARMACY (OUTPATIENT)
Dept: PHARMACY | Facility: CLINIC | Age: 72
End: 2022-02-21
Payer: MEDICARE

## 2022-02-21 NOTE — TELEPHONE ENCOUNTER
Specialty Pharmacy - Refill Coordination    Specialty Medication Orders Linked to Encounter    Flowsheet Row Most Recent Value   Medication #1 tenofovir disoproxil fumarate (VIREAD) 300 mg Tab (Order#[id number], Rx#[id number])          Refill Questions - Documented Responses    Flowsheet Row Most Recent Value   Patient Availability and HIPAA Verification    Does patient want to proceed with activity? Yes   HIPAA/medical authority confirmed? Yes   Relationship to patient of person spoken to? Self   Refill Screening Questions    Changes to allergies? No   Changes to medications? No   New conditions since last clinic visit? No   Unplanned office visit, urgent care, ED, or hospital admission in the last 4 weeks? No   How does patient/caregiver feel medication is working? Good   Financial problems or insurance changes? No   How many doses of your specialty medications were missed in the last 4 weeks? 0   Would patient like to speak to a pharmacist? No   When does the patient need to receive the medication? 02/26/22   Refill Delivery Questions    How will the patient receive the medication? Delivery Millie   When does the patient need to receive the medication? 02/26/22   Shipping Address Prescription   Address in [facility] confirmed and updated if neccessary? Yes   Expected Copay ($) 0   Is the patient able to afford the medication copay? Yes   Payment Method zero copay   Days supply of Refill 30   Supplies needed? No supplies needed   Refill activity completed? Yes   Refill activity plan Refill scheduled   Shipment/Pickup Date: 02/24/22          Current Outpatient Medications   Medication Sig    aspirin 81 MG Chew Take 81 mg by mouth once daily.    cetirizine (ZYRTEC) 10 MG tablet Take 10 mg by mouth once daily.    cholecalciferol, vitamin D3, (VITAMIN D3) 50 mcg (2,000 unit) Tab Take 1 tablet by mouth once daily.    dexlansoprazole (DEXILANT) 60 mg capsule Take 60 mg by mouth once daily.    fluticasone  (FLONASE) 50 mcg/actuation nasal spray 1 spray by Each Nostril route daily as needed.    levothyroxine (SYNTHROID) 75 MCG tablet Take 1 tablet by mouth once daily.    lisinopriL 10 MG tablet TAKE 1 TABLET ORALLY ONCE A DAY.    loratadine (CLARITIN) 10 mg tablet 1 tablet    omega-3 fatty acids/fish oil (FISH OIL-OMEGA-3 FATTY ACIDS) 300-1,000 mg capsule Take 2 capsules by mouth once daily.    PROLIA 60 mg/mL Syrg SMARTSI Milligram(s) SUB-Q Twice a Year    tenofovir disoproxil fumarate (VIREAD) 300 mg Tab Take 1 tablet (300 mg total) by mouth once daily.   Last reviewed on 2021  9:45 AM by Tej Ferrara MA    Review of patient's allergies indicates:  No Known Allergies Last reviewed on  2022 10:08 AM by Bridget Cadena      Tasks added this encounter   3/21/2022 - Refill Call (Auto Added)   Tasks due within next 3 months   No tasks due.     Tabitha Harrington [facility] - Specialty Pharmacy  [address] [place] [address]  Phone: [phone]  Fax: [phone]

## 2022-03-21 ENCOUNTER — SPECIALTY PHARMACY (OUTPATIENT)
Dept: PHARMACY | Facility: CLINIC | Age: 72
End: 2022-03-21
Payer: MEDICARE

## 2022-03-21 NOTE — TELEPHONE ENCOUNTER
Specialty Pharmacy - Refill Coordination    Specialty Medication Orders Linked to Encounter    Flowsheet Row Most Recent Value   Medication #1 tenofovir disoproxil fumarate (VIREAD) 300 mg Tab (Order#[id number], Rx#[id number])          Refill Questions - Documented Responses    Flowsheet Row Most Recent Value   Patient Availability and HIPAA Verification    Does patient want to proceed with activity? Yes   HIPAA/medical authority confirmed? Yes   Relationship to patient of person spoken to? Self   Refill Screening Questions    Changes to allergies? No   Changes to medications? No   New conditions since last clinic visit? No   Unplanned office visit, urgent care, ED, or hospital admission in the last 4 weeks? No   How does patient/caregiver feel medication is working? Good   Financial problems or insurance changes? No   How many doses of your specialty medications were missed in the last 4 weeks? 0   Would patient like to speak to a pharmacist? No   When does the patient need to receive the medication? 03/25/22   Refill Delivery Questions    How will the patient receive the medication? Delivery Millie   When does the patient need to receive the medication? 03/25/22   Shipping Address Prescription   Address in [facility] confirmed and updated if neccessary? Yes   Expected Copay ($) 0   Is the patient able to afford the medication copay? Yes   Payment Method zero copay   Days supply of Refill 30   Supplies needed? No supplies needed   Refill activity completed? Yes   Refill activity plan Refill scheduled   Shipment/Pickup Date: 03/25/22          Current Outpatient Medications   Medication Sig    aspirin 81 MG Chew Take 81 mg by mouth once daily.    cetirizine (ZYRTEC) 10 MG tablet Take 10 mg by mouth once daily.    cholecalciferol, vitamin D3, (VITAMIN D3) 50 mcg (2,000 unit) Tab Take 1 tablet by mouth once daily.    dexlansoprazole (DEXILANT) 60 mg capsule Take 60 mg by mouth once daily.    fluticasone  (FLONASE) 50 mcg/actuation nasal spray 1 spray by Each Nostril route daily as needed.    levothyroxine (SYNTHROID) 75 MCG tablet Take 1 tablet by mouth once daily.    lisinopriL 10 MG tablet TAKE 1 TABLET ORALLY ONCE A DAY.    loratadine (CLARITIN) 10 mg tablet 1 tablet    omega-3 fatty acids/fish oil (FISH OIL-OMEGA-3 FATTY ACIDS) 300-1,000 mg capsule Take 2 capsules by mouth once daily.    PROLIA 60 mg/mL Syrg SMARTSI Milligram(s) SUB-Q Twice a Year    tenofovir disoproxil fumarate (VIREAD) 300 mg Tab Take 1 tablet (300 mg total) by mouth once daily.   Last reviewed on 2021  9:45 AM by Tej Ferrara MA    Review of patient's allergies indicates:  No Known Allergies Last reviewed on  2022 10:08 AM by Bridget Cadena      Tasks added this encounter   2022 - Refill Call (Auto Added)   Tasks due within next 3 months   No tasks due.     Tabitha Harrington [facility] - Specialty Pharmacy  [address] [place] [address]  Phone: [phone]  Fax: [phone]

## 2022-04-19 ENCOUNTER — SPECIALTY PHARMACY (OUTPATIENT)
Dept: PHARMACY | Facility: CLINIC | Age: 72
End: 2022-04-19
Payer: MEDICARE

## 2022-04-19 NOTE — TELEPHONE ENCOUNTER
Specialty Pharmacy - Refill Coordination    Specialty Medication Orders Linked to Encounter    Flowsheet Row Most Recent Value   Medication #1 tenofovir disoproxil fumarate (VIREAD) 300 mg Tab (Order#[id number], Rx#[id number])          Refill Questions - Documented Responses    Flowsheet Row Most Recent Value   Patient Availability and HIPAA Verification    Does patient want to proceed with activity? Yes   HIPAA/medical authority confirmed? Yes   Relationship to patient of person spoken to? Self   Refill Screening Questions    Changes to allergies? No   Changes to medications? No   New conditions since last clinic visit? No   Unplanned office visit, urgent care, ED, or hospital admission in the last 4 weeks? No   How does patient/caregiver feel medication is working? Good   Financial problems or insurance changes? No   How many doses of your specialty medications were missed in the last 4 weeks? 0   Would patient like to speak to a pharmacist? No   When does the patient need to receive the medication? 04/25/22   Refill Delivery Questions    How will the patient receive the medication? Delivery Millie   When does the patient need to receive the medication? 04/25/22   Shipping Address Prescription   Address in [facility] confirmed and updated if neccessary? Yes   Expected Copay ($) 0   Is the patient able to afford the medication copay? Yes   Payment Method zero copay   Days supply of Refill 30   Supplies needed? No supplies needed   Refill activity completed? Yes   Refill activity plan Refill scheduled   Shipment/Pickup Date: 04/25/22          Current Outpatient Medications   Medication Sig    aspirin 81 MG Chew Take 81 mg by mouth once daily.    cetirizine (ZYRTEC) 10 MG tablet Take 10 mg by mouth once daily.    cholecalciferol, vitamin D3, (VITAMIN D3) 50 mcg (2,000 unit) Tab Take 1 tablet by mouth once daily.    dexlansoprazole (DEXILANT) 60 mg capsule Take 60 mg by mouth once daily.    fluticasone  (FLONASE) 50 mcg/actuation nasal spray 1 spray by Each Nostril route daily as needed.    levothyroxine (SYNTHROID) 75 MCG tablet Take 1 tablet by mouth once daily.    lisinopriL 10 MG tablet TAKE 1 TABLET ORALLY ONCE A DAY.    loratadine (CLARITIN) 10 mg tablet 1 tablet    omega-3 fatty acids/fish oil (FISH OIL-OMEGA-3 FATTY ACIDS) 300-1,000 mg capsule Take 2 capsules by mouth once daily.    PROLIA 60 mg/mL Syrg SMARTSI Milligram(s) SUB-Q Twice a Year    tenofovir disoproxil fumarate (VIREAD) 300 mg Tab Take 1 tablet (300 mg total) by mouth once daily.   Last reviewed on 2021  9:45 AM by Tej Ferrara MA    Review of patient's allergies indicates:  No Known Allergies Last reviewed on  2022 10:08 AM by Bridget Cadena      Tasks added this encounter   2022 - Refill Call (Auto Added)   Tasks due within next 3 months   No tasks due.     Tabitha Harrington [facility] - Specialty Pharmacy  [address] [facility] [address]  Phone: [phone]  Fax: [phone]

## 2022-05-18 ENCOUNTER — SPECIALTY PHARMACY (OUTPATIENT)
Dept: PHARMACY | Facility: CLINIC | Age: 72
End: 2022-05-18
Payer: MEDICARE

## 2022-05-18 NOTE — TELEPHONE ENCOUNTER
Specialty Pharmacy - Refill Coordination    Specialty Medication Orders Linked to Encounter    Flowsheet Row Most Recent Value   Medication #1 tenofovir disoproxil fumarate (VIREAD) 300 mg Tab (Order#[id number], Rx#[id number])          Refill Questions - Documented Responses    Flowsheet Row Most Recent Value   Patient Availability and HIPAA Verification    Does patient want to proceed with activity? Yes   HIPAA/medical authority confirmed? Yes   Relationship to patient of person spoken to? Self   Refill Screening Questions    Changes to allergies? No   Changes to medications? No   New conditions since last clinic visit? No   Unplanned office visit, urgent care, ED, or hospital admission in the last 4 weeks? No   How does patient/caregiver feel medication is working? Good   Financial problems or insurance changes? No   How many doses of your specialty medications were missed in the last 4 weeks? 0   Would patient like to speak to a pharmacist? No   When does the patient need to receive the medication? 05/24/22   Refill Delivery Questions    How will the patient receive the medication? Delivery Millie   When does the patient need to receive the medication? 05/24/22   Shipping Address Home   Address in [facility] confirmed and updated if neccessary? Yes   Expected Copay ($) 0   Is the patient able to afford the medication copay? Yes   Payment Method zero copay   Days supply of Refill 30   Supplies needed? No supplies needed   Refill activity completed? Yes   Refill activity plan Refill scheduled   Shipment/Pickup Date: 05/24/22          Current Outpatient Medications   Medication Sig    aspirin 81 MG Chew Take 81 mg by mouth once daily.    cetirizine (ZYRTEC) 10 MG tablet Take 10 mg by mouth once daily.    cholecalciferol, vitamin D3, (VITAMIN D3) 50 mcg (2,000 unit) Tab Take 1 tablet by mouth once daily.    dexlansoprazole (DEXILANT) 60 mg capsule Take 60 mg by mouth once daily.    fluticasone (FLONASE) 50  mcg/actuation nasal spray 1 spray by Each Nostril route daily as needed.    levothyroxine (SYNTHROID) 75 MCG tablet Take 1 tablet by mouth once daily.    lisinopriL 10 MG tablet TAKE 1 TABLET ORALLY ONCE A DAY.    loratadine (CLARITIN) 10 mg tablet 1 tablet    omega-3 fatty acids/fish oil (FISH OIL-OMEGA-3 FATTY ACIDS) 300-1,000 mg capsule Take 2 capsules by mouth once daily.    PROLIA 60 mg/mL Syrg SMARTSI Milligram(s) SUB-Q Twice a Year    tenofovir disoproxil fumarate (VIREAD) 300 mg Tab Take 1 tablet (300 mg total) by mouth once daily.   Last reviewed on 2021  9:45 AM by Tej Ferrara MA    Review of patient's allergies indicates:  No Known Allergies Last reviewed on  2022 10:08 AM by Bridget Cadena      Tasks added this encounter   2022 - Refill Call (Auto Added)   Tasks due within next 3 months   No tasks due.     Tabitha Harrington [facility] - Specialty Pharmacy  [address] [place] [id number]  Phone: [phone]  Fax: [phone]

## 2022-06-16 ENCOUNTER — SPECIALTY PHARMACY (OUTPATIENT)
Dept: PHARMACY | Facility: CLINIC | Age: 72
End: 2022-06-16
Payer: MEDICARE

## 2022-06-16 NOTE — TELEPHONE ENCOUNTER
Specialty Pharmacy - Refill Coordination    Specialty Medication Orders Linked to Encounter    Flowsheet Row Most Recent Value   Medication #1 tenofovir disoproxil fumarate (VIREAD) 300 mg Tab (Order#[id number], Rx#[id number])          Refill Questions - Documented Responses    Flowsheet Row Most Recent Value   Patient Availability and HIPAA Verification    Does patient want to proceed with activity? Yes   HIPAA/medical authority confirmed? Yes   Relationship to patient of person spoken to? Self   Refill Screening Questions    Changes to allergies? No   Changes to medications? No   Unplanned office visit, urgent care, ED, or hospital admission in the last 4 weeks? No   How does patient/caregiver feel medication is working? Good   Financial problems or insurance changes? No   How many doses of your specialty medications were missed in the last 4 weeks? 0   Would patient like to speak to a pharmacist? No   When does the patient need to receive the medication? 06/21/22   Refill Delivery Questions    When does the patient need to receive the medication? 06/21/22   Shipping Address Home   Address in [facility] confirmed and updated if neccessary? Yes   Expected Copay ($) 0   Is the patient able to afford the medication copay? Yes   Payment Method zero copay   Days supply of Refill 30   Supplies needed? No supplies needed   Refill activity completed? Yes   Refill activity plan Refill scheduled   Shipment/Pickup Date: 06/21/22          Current Outpatient Medications   Medication Sig    aspirin 81 MG Chew Take 81 mg by mouth once daily.    cetirizine (ZYRTEC) 10 MG tablet Take 10 mg by mouth once daily.    cholecalciferol, vitamin D3, (VITAMIN D3) 50 mcg (2,000 unit) Tab Take 1 tablet by mouth once daily.    dexlansoprazole (DEXILANT) 60 mg capsule Take 60 mg by mouth once daily.    fluticasone (FLONASE) 50 mcg/actuation nasal spray 1 spray by Each Nostril route daily as needed.    hydroCHLOROthiazide  (HYDRODIURIL) 12.5 MG Tab Take 12.5 mg by mouth once daily.    levothyroxine (SYNTHROID) 50 MCG tablet Take 50 mcg by mouth every morning.    lisinopriL 10 MG tablet TAKE 1 TABLET ORALLY ONCE A DAY.    loratadine (CLARITIN) 10 mg tablet 1 tablet    omega-3 fatty acids/fish oil (FISH OIL-OMEGA-3 FATTY ACIDS) 300-1,000 mg capsule Take 2 capsules by mouth once daily.    PROLIA 60 mg/mL Syrg SMARTSI Milligram(s) SUB-Q Twice a Year    rosuvastatin (CRESTOR) 10 MG tablet TAKE 1 TABLET BY MOUTH ONCE A DAY IN THE EVENING.    tenofovir disoproxil fumarate (VIREAD) 300 mg Tab Take 1 tablet (300 mg total) by mouth once daily.   Last reviewed on 2022  9:06 AM by Tej Ferrara MA    Review of patient's allergies indicates:  No Known Allergies Last reviewed on  2022 9:04 AM by Tej Ferrara      Tasks added this encounter   2022 - Refill Call (Auto Added)   Tasks due within next 3 months   No tasks due.     Tabitha Harrington [facility] - Specialty Pharmacy  [address] [place] [id number]  Phone: [phone]  Fax: [phone]

## 2022-07-13 ENCOUNTER — SPECIALTY PHARMACY (OUTPATIENT)
Dept: PHARMACY | Facility: CLINIC | Age: 72
End: 2022-07-13
Payer: MEDICARE

## 2022-07-13 NOTE — TELEPHONE ENCOUNTER
Specialty Pharmacy - Refill Coordination    Specialty Medication Orders Linked to Encounter    Flowsheet Row Most Recent Value   Medication #1 tenofovir disoproxil fumarate (VIREAD) 300 mg Tab (Order#[id number], Rx#[id number])          Refill Questions - Documented Responses    Flowsheet Row Most Recent Value   Patient Availability and HIPAA Verification    Does patient want to proceed with activity? Yes   HIPAA/medical authority confirmed? Yes   Relationship to patient of person spoken to? Self   Refill Screening Questions    Changes to allergies? No   Changes to medications? No   New conditions since last clinic visit? No   Unplanned office visit, urgent care, ED, or hospital admission in the last 4 weeks? No   How does patient/caregiver feel medication is working? Good   Financial problems or insurance changes? No   How many doses of your specialty medications were missed in the last 4 weeks? 0   Would patient like to speak to a pharmacist? No   When does the patient need to receive the medication? 07/20/22   Refill Delivery Questions    How will the patient receive the medication? Delivery Millie   When does the patient need to receive the medication? 07/20/22   Shipping Address Prescription   Address in [facility] confirmed and updated if neccessary? Yes   Expected Copay ($) 0   Is the patient able to afford the medication copay? Yes   Payment Method zero copay   Days supply of Refill 30   Supplies needed? No supplies needed   Refill activity completed? Yes   Refill activity plan Refill scheduled   Shipment/Pickup Date: 07/18/22          Current Outpatient Medications   Medication Sig    aspirin 81 MG Chew Take 81 mg by mouth once daily.    cetirizine (ZYRTEC) 10 MG tablet Take 10 mg by mouth once daily.    cholecalciferol, vitamin D3, (VITAMIN D3) 50 mcg (2,000 unit) Tab Take 1 tablet by mouth once daily.    dexlansoprazole (DEXILANT) 60 mg capsule Take 60 mg by mouth once daily.    fluticasone  (FLONASE) 50 mcg/actuation nasal spray 1 spray by Each Nostril route daily as needed.    hydroCHLOROthiazide (HYDRODIURIL) 12.5 MG Tab Take 12.5 mg by mouth once daily.    levothyroxine (SYNTHROID) 50 MCG tablet Take 50 mcg by mouth every morning.    lisinopriL 10 MG tablet TAKE 1 TABLET ORALLY ONCE A DAY.    loratadine (CLARITIN) 10 mg tablet 1 tablet    omega-3 fatty acids/fish oil (FISH OIL-OMEGA-3 FATTY ACIDS) 300-1,000 mg capsule Take 2 capsules by mouth once daily.    PROLIA 60 mg/mL Syrg SMARTSI Milligram(s) SUB-Q Twice a Year    rosuvastatin (CRESTOR) 10 MG tablet TAKE 1 TABLET BY MOUTH ONCE A DAY IN THE EVENING.    tenofovir disoproxil fumarate (VIREAD) 300 mg Tab Take 1 tablet (300 mg total) by mouth once daily.   Last reviewed on 2022  9:06 AM by Tej Ferrara MA    Review of patient's allergies indicates:  No Known Allergies Last reviewed on  2022 9:04 AM by Tej Ferrara      Tasks added this encounter   2022 - Refill Call (Auto Added)   Tasks due within next 3 months   No tasks due.     Anel Cabral, Patient Care Assistant  [facility]  [address] [place] [id number]  Phone: [phone]  Fax: [phone]

## 2022-08-12 ENCOUNTER — SPECIALTY PHARMACY (OUTPATIENT)
Dept: PHARMACY | Facility: CLINIC | Age: 72
End: 2022-08-12
Payer: MEDICARE

## 2022-08-12 NOTE — TELEPHONE ENCOUNTER
Specialty Pharmacy - Refill Coordination    Specialty Medication Orders Linked to Encounter    Flowsheet Row Most Recent Value   Medication #1 tenofovir disoproxil fumarate (VIREAD) 300 mg Tab (Order#[id number], Rx#[id number])          Refill Questions - Documented Responses    Flowsheet Row Most Recent Value   Patient Availability and HIPAA Verification    Does patient want to proceed with activity? Yes   HIPAA/medical authority confirmed? Yes   Relationship to patient of person spoken to? Self   Refill Screening Questions    Changes to allergies? No   Changes to medications? No   New conditions since last clinic visit? No   Unplanned office visit, urgent care, ED, or hospital admission in the last 4 weeks? No   How does patient/caregiver feel medication is working? Good   Financial problems or insurance changes? No   How many doses of your specialty medications were missed in the last 4 weeks? 0   Would patient like to speak to a pharmacist? No   When does the patient need to receive the medication? 08/17/22   Refill Delivery Questions    How will the patient receive the medication? Delivery Millie   When does the patient need to receive the medication? 08/17/22   Shipping Address Home   Address in [facility] confirmed and updated if neccessary? Yes   Expected Copay ($) 0   Is the patient able to afford the medication copay? Yes   Payment Method zero copay   Days supply of Refill 30   Supplies needed? No supplies needed   Refill activity completed? Yes   Refill activity plan Refill scheduled   Shipment/Pickup Date: 08/16/22          Current Outpatient Medications   Medication Sig    aspirin 81 MG Chew Take 81 mg by mouth once daily.    cetirizine (ZYRTEC) 10 MG tablet Take 10 mg by mouth once daily.    cholecalciferol, vitamin D3, (VITAMIN D3) 50 mcg (2,000 unit) Tab Take 1 tablet by mouth once daily.    dexlansoprazole (DEXILANT) 60 mg capsule Take 60 mg by mouth once daily.    fluticasone (FLONASE) 50  mcg/actuation nasal spray 1 spray by Each Nostril route daily as needed.    hydroCHLOROthiazide (HYDRODIURIL) 12.5 MG Tab Take 12.5 mg by mouth once daily.    levothyroxine (SYNTHROID) 50 MCG tablet Take 50 mcg by mouth every morning.    lisinopriL 10 MG tablet TAKE 1 TABLET ORALLY ONCE A DAY.    loratadine (CLARITIN) 10 mg tablet 1 tablet    omega-3 fatty acids/fish oil (FISH OIL-OMEGA-3 FATTY ACIDS) 300-1,000 mg capsule Take 2 capsules by mouth once daily.    PROLIA 60 mg/mL Syrg SMARTSI Milligram(s) SUB-Q Twice a Year    rosuvastatin (CRESTOR) 10 MG tablet TAKE 1 TABLET BY MOUTH ONCE A DAY IN THE EVENING.    tenofovir disoproxil fumarate (VIREAD) 300 mg Tab Take 1 tablet (300 mg total) by mouth once daily.   Last reviewed on 2022  9:06 AM by Tej Ferrara MA    Review of patient's allergies indicates:  No Known Allergies Last reviewed on  2022 9:04 AM by Tej Ferrara      Tasks added this encounter   2022 - Refill Call (Auto Added)   Tasks due within next 3 months   No tasks due.     [facility]  [address] [place] [address]  Phone: [phone]  Fax: [phone]

## 2022-09-09 ENCOUNTER — SPECIALTY PHARMACY (OUTPATIENT)
Dept: PHARMACY | Facility: CLINIC | Age: 72
End: 2022-09-09
Payer: MEDICARE

## 2022-09-13 NOTE — TELEPHONE ENCOUNTER
Specialty Pharmacy - Refill Coordination    Specialty Medication Orders Linked to Encounter      Flowsheet Row Most Recent Value   Medication #1 tenofovir disoproxil fumarate (VIREAD) 300 mg Tab (Order#[id number], Rx#[id number])            Refill Questions - Documented Responses      Flowsheet Row Most Recent Value   Patient Availability and HIPAA Verification    Does patient want to proceed with activity? Yes   HIPAA/medical authority confirmed? Yes   Relationship to patient of person spoken to? Self   Refill Screening Questions    Changes to allergies? No   Changes to medications? No   New conditions since last clinic visit? No   Unplanned office visit, urgent care, ED, or hospital admission in the last 4 weeks? No   How does patient/caregiver feel medication is working? Good   Financial problems or insurance changes? No   How many doses of your specialty medications were missed in the last 4 weeks? 0   Would patient like to speak to a pharmacist? No   When does the patient need to receive the medication? 09/20/22   Refill Delivery Questions    How will the patient receive the medication? Delivery Millie   When does the patient need to receive the medication? 09/20/22   Shipping Address Home   Address in [facility] confirmed and updated if neccessary? No   Expected Copay ($) 0   Is the patient able to afford the medication copay? Yes   Payment Method zero copay   Days supply of Refill 30   Supplies needed? No supplies needed   Refill activity completed? No   Refill activity plan Refill scheduled   Shipment/Pickup Date: 09/15/22            Current Outpatient Medications   Medication Sig    aspirin 81 MG Chew Take 81 mg by mouth once daily.    cetirizine (ZYRTEC) 10 MG tablet Take 10 mg by mouth once daily.    cholecalciferol, vitamin D3, (VITAMIN D3) 50 mcg (2,000 unit) Tab Take 1 tablet by mouth once daily.    dexlansoprazole (DEXILANT) 60 mg capsule Take 60 mg by mouth once daily.    fluticasone (FLONASE) 50  mcg/actuation nasal spray 1 spray by Each Nostril route daily as needed.    hydroCHLOROthiazide (HYDRODIURIL) 12.5 MG Tab Take 12.5 mg by mouth once daily.    levothyroxine (SYNTHROID) 50 MCG tablet Take 50 mcg by mouth every morning.    lisinopriL 10 MG tablet TAKE 1 TABLET ORALLY ONCE A DAY.    loratadine (CLARITIN) 10 mg tablet 1 tablet    omega-3 fatty acids/fish oil (FISH OIL-OMEGA-3 FATTY ACIDS) 300-1,000 mg capsule Take 2 capsules by mouth once daily.    PROLIA 60 mg/mL Syrg SMARTSI Milligram(s) SUB-Q Twice a Year    rosuvastatin (CRESTOR) 10 MG tablet TAKE 1 TABLET BY MOUTH ONCE A DAY IN THE EVENING.    tenofovir disoproxil fumarate (VIREAD) 300 mg Tab Take 1 tablet (300 mg total) by mouth once daily.   Last reviewed on 2022  9:06 AM by Tej Ferrara MA    Review of patient's allergies indicates:  No Known Allergies Last reviewed on  2022 9:04 AM by Tej Ferrara      Tasks added this encounter   10/11/2022 - Refill Call (Auto Added)   Tasks due within next 3 months   No tasks due.     Rosie Bullock, PharmD  [facility]  [address] [place], [place] [address]  [facility] [address]  Phone: [phone]  Fax: [phone]

## 2022-10-13 ENCOUNTER — SPECIALTY PHARMACY (OUTPATIENT)
Dept: PHARMACY | Facility: CLINIC | Age: 72
End: 2022-10-13
Payer: MEDICARE

## 2022-10-13 NOTE — TELEPHONE ENCOUNTER
Specialty Pharmacy - Refill Coordination    Specialty Medication Orders Linked to Encounter      Flowsheet Row Most Recent Value   Medication #1 tenofovir disoproxil fumarate (VIREAD) 300 mg Tab (Order#[id number], Rx#[id number])            Refill Questions - Documented Responses      Flowsheet Row Most Recent Value   Patient Availability and HIPAA Verification    Does patient want to proceed with activity? Yes   HIPAA/medical authority confirmed? Yes   Relationship to patient of person spoken to? Self   Refill Screening Questions    Changes to allergies? No   Changes to medications? No   New conditions since last clinic visit? No   Unplanned office visit, urgent care, ED, or hospital admission in the last 4 weeks? No   How does patient/caregiver feel medication is working? Good   Financial problems or insurance changes? No   How many doses of your specialty medications were missed in the last 4 weeks? 0   Would patient like to speak to a pharmacist? No   When does the patient need to receive the medication? 10/19/22   Refill Delivery Questions    How will the patient receive the medication? MEDRx   When does the patient need to receive the medication? 10/19/22   Shipping Address Home   Address in [facility] confirmed and updated if neccessary? Yes   Expected Copay ($) 0   Is the patient able to afford the medication copay? Yes   Payment Method zero copay   Days supply of Refill 30   Supplies needed? No supplies needed   Refill activity completed? Yes   Refill activity plan Refill scheduled   Shipment/Pickup Date: 10/18/22            Current Outpatient Medications   Medication Sig    aspirin 81 MG Chew Take 81 mg by mouth once daily.    cetirizine (ZYRTEC) 10 MG tablet Take 10 mg by mouth once daily.    cholecalciferol, vitamin D3, (VITAMIN D3) 50 mcg (2,000 unit) Tab Take 1 tablet by mouth once daily.    dexlansoprazole (DEXILANT) 60 mg capsule Take 60 mg by mouth once daily.    fluticasone (FLONASE) 50  mcg/actuation nasal spray 1 spray by Each Nostril route daily as needed.    hydroCHLOROthiazide (HYDRODIURIL) 12.5 MG Tab Take 12.5 mg by mouth once daily.    levothyroxine (SYNTHROID) 50 MCG tablet Take 50 mcg by mouth every morning.    lisinopriL 10 MG tablet TAKE 1 TABLET ORALLY ONCE A DAY.    loratadine (CLARITIN) 10 mg tablet 1 tablet    omega-3 fatty acids/fish oil (FISH OIL-OMEGA-3 FATTY ACIDS) 300-1,000 mg capsule Take 2 capsules by mouth once daily.    PROLIA 60 mg/mL Syrg SMARTSI Milligram(s) SUB-Q Twice a Year    rosuvastatin (CRESTOR) 10 MG tablet TAKE 1 TABLET BY MOUTH ONCE A DAY IN THE EVENING.    tenofovir disoproxil fumarate (VIREAD) 300 mg Tab Take 1 tablet (300 mg total) by mouth once daily.   Last reviewed on 2022  9:06 AM by Tej Ferrara MA    Review of patient's allergies indicates:  No Known Allergies Last reviewed on  2022 9:04 AM by Tej Ferrara      Tasks added this encounter   2022 - Refill Call (Auto Added)   Tasks due within next 3 months   No tasks due.     [facility]  [address] [place] [address]  Phone: [phone]  Fax: [phone]

## 2022-11-11 ENCOUNTER — SPECIALTY PHARMACY (OUTPATIENT)
Dept: PHARMACY | Facility: CLINIC | Age: 72
End: 2022-11-11
Payer: MEDICARE

## 2022-11-11 NOTE — TELEPHONE ENCOUNTER
Specialty Pharmacy - Refill Coordination    Specialty Medication Orders Linked to Encounter      Flowsheet Row Most Recent Value   Medication #1 tenofovir disoproxil fumarate (VIREAD) 300 mg Tab (Order#[id number], Rx#[id number])            Refill Questions - Documented Responses      Flowsheet Row Most Recent Value   Patient Availability and HIPAA Verification    Does patient want to proceed with activity? Yes   HIPAA/medical authority confirmed? Yes   Relationship to patient of person spoken to? Self   Refill Screening Questions    Changes to allergies? No   Changes to medications? No   New conditions since last clinic visit? No   Unplanned office visit, urgent care, ED, or hospital admission in the last 4 weeks? No   How does patient/caregiver feel medication is working? Good   Financial problems or insurance changes? No   How many doses of your specialty medications were missed in the last 4 weeks? 0   Would patient like to speak to a pharmacist? No   When does the patient need to receive the medication? 11/16/22   Refill Delivery Questions    How will the patient receive the medication? MEDRx   When does the patient need to receive the medication? 11/16/22   Shipping Address Home   Address in [facility] confirmed and updated if neccessary? Yes   Expected Copay ($) 0   Is the patient able to afford the medication copay? Yes   Payment Method zero copay   Days supply of Refill 30   Refill activity completed? Yes   Refill activity plan Refill scheduled   Shipment/Pickup Date: 11/15/22            Current Outpatient Medications   Medication Sig    aspirin 81 MG Chew Take 81 mg by mouth once daily.    cetirizine (ZYRTEC) 10 MG tablet Take 10 mg by mouth once daily.    cholecalciferol, vitamin D3, (VITAMIN D3) 50 mcg (2,000 unit) Tab Take 1 tablet by mouth once daily.    dexlansoprazole (DEXILANT) 60 mg capsule Take 60 mg by mouth once daily.    fluticasone (FLONASE) 50 mcg/actuation nasal spray 1 spray by Each  Nostril route daily as needed.    hydroCHLOROthiazide (HYDRODIURIL) 12.5 MG Tab Take 12.5 mg by mouth once daily.    levothyroxine (SYNTHROID) 50 MCG tablet Take 50 mcg by mouth every morning.    lisinopriL 10 MG tablet TAKE 1 TABLET ORALLY ONCE A DAY.    loratadine (CLARITIN) 10 mg tablet 1 tablet    omega-3 fatty acids/fish oil (FISH OIL-OMEGA-3 FATTY ACIDS) 300-1,000 mg capsule Take 2 capsules by mouth once daily.    PROLIA 60 mg/mL Syrg SMARTSI Milligram(s) SUB-Q Twice a Year    rosuvastatin (CRESTOR) 10 MG tablet TAKE 1 TABLET BY MOUTH ONCE A DAY IN THE EVENING.    tenofovir disoproxil fumarate (VIREAD) 300 mg Tab Take 1 tablet (300 mg total) by mouth once daily.   Last reviewed on 2022  9:06 AM by Tej Ferrara MA    Review of patient's allergies indicates:  No Known Allergies Last reviewed on  2022 9:04 AM by Tej Ferrara      Tasks added this encounter   2022 - Refill Call (Auto Added)   Tasks due within next 3 months   No tasks due.     [facility] - [facility]ty Pharmacy  [address] [place] [address]  Phone: [phone]  Fax: [phone]

## 2022-12-09 ENCOUNTER — SPECIALTY PHARMACY (OUTPATIENT)
Dept: PHARMACY | Facility: CLINIC | Age: 72
End: 2022-12-09
Payer: MEDICARE

## 2022-12-09 NOTE — TELEPHONE ENCOUNTER
Specialty Pharmacy - Refill Coordination    Specialty Medication Orders Linked to Encounter      Flowsheet Row Most Recent Value   Medication #1 tenofovir disoproxil fumarate (VIREAD) 300 mg Tab (Order#[id number], Rx#[id number])            Refill Questions - Documented Responses      Flowsheet Row Most Recent Value   Patient Availability and HIPAA Verification    Does patient want to proceed with activity? Yes   HIPAA/medical authority confirmed? Yes   Relationship to patient of person spoken to? Self   Refill Screening Questions    Changes to allergies? No   Changes to medications? No   New conditions since last clinic visit? No   Unplanned office visit, urgent care, ED, or hospital admission in the last 4 weeks? No   How does patient/caregiver feel medication is working? Good   Financial problems or insurance changes? No   How many doses of your specialty medications were missed in the last 4 weeks? 0   Would patient like to speak to a pharmacist? No   When does the patient need to receive the medication? 12/14/22   Refill Delivery Questions    How will the patient receive the medication? MEDRx   When does the patient need to receive the medication? 12/14/22   Shipping Address Prescription   Address in [facility] confirmed and updated if neccessary? Yes   Expected Copay ($) 0   Is the patient able to afford the medication copay? Yes   Payment Method zero copay   Days supply of Refill 30   Refill activity completed? Yes   Refill activity plan Refill scheduled   Shipment/Pickup Date: 12/13/22            Current Outpatient Medications   Medication Sig    aspirin 81 MG Chew Take 81 mg by mouth once daily.    cetirizine (ZYRTEC) 10 MG tablet Take 10 mg by mouth once daily.    cholecalciferol, vitamin D3, (VITAMIN D3) 50 mcg (2,000 unit) Tab Take 1 tablet by mouth once daily.    dexlansoprazole (DEXILANT) 60 mg capsule Take 60 mg by mouth once daily.    fluticasone (FLONASE) 50 mcg/actuation nasal spray 1 spray  by Each Nostril route daily as needed.    hydroCHLOROthiazide (HYDRODIURIL) 12.5 MG Tab Take 12.5 mg by mouth once daily.    lactobacillus combo #5 (PROVELLA) 150 mg (2 billion cell) TbEC Take 150 mg by mouth. Every 6 months    levothyroxine (SYNTHROID) 50 MCG tablet Take 50 mcg by mouth every morning.    lisinopriL 10 MG tablet TAKE 1 TABLET ORALLY ONCE A DAY.    loratadine (CLARITIN) 10 mg tablet 1 tablet    omega-3 fatty acids/fish oil (FISH OIL-OMEGA-3 FATTY ACIDS) 300-1,000 mg capsule Take 2 capsules by mouth once daily.    PROLIA 60 mg/mL Syrg SMARTSI Milligram(s) SUB-Q Twice a Year    rosuvastatin (CRESTOR) 10 MG tablet TAKE 1 TABLET BY MOUTH ONCE A DAY IN THE EVENING.    tenofovir disoproxil fumarate (VIREAD) 300 mg Tab Take 1 tablet (300 mg total) by mouth once daily.    vit C,K-Wq-vzzxt-lutein-zeaxan (PRESERVISION AREDS-2) 250-90-40-1 mg Cap Take 1 capsule by mouth Daily.   Last reviewed on 2022 10:04 AM by Kathryn Santoro LPN    Review of patient's allergies indicates:  No Known Allergies Last reviewed on  2022 9:36 AM by Kathryn Santoro      Tasks added this encounter   2023 - Refill Call (Auto Added)   Tasks due within next 3 months   No tasks due.     [facility]  [address] [place] [id number]  Phone: [phone]  Fax: [phone]

## 2023-01-10 ENCOUNTER — SPECIALTY PHARMACY (OUTPATIENT)
Dept: PHARMACY | Facility: CLINIC | Age: 73
End: 2023-01-10
Payer: MEDICARE

## 2023-01-10 NOTE — TELEPHONE ENCOUNTER
Specialty Pharmacy - Refill Coordination    Specialty Medication Orders Linked to Encounter      Flowsheet Row Most Recent Value   Medication #1 tenofovir disoproxil fumarate (VIREAD) 300 mg Tab (Order#[id number], Rx#[id number])            Refill Questions - Documented Responses      Flowsheet Row Most Recent Value   Patient Availability and HIPAA Verification    Does patient want to proceed with activity? Yes   HIPAA/medical authority confirmed? Yes   Relationship to patient of person spoken to? Self   Refill Screening Questions    Changes to allergies? No   Changes to medications? No   New conditions since last clinic visit? No   Unplanned office visit, urgent care, ED, or hospital admission in the last 4 weeks? No   How does patient/caregiver feel medication is working? Good   Financial problems or insurance changes? No   How many doses of your specialty medications were missed in the last 4 weeks? 0   Would patient like to speak to a pharmacist? No   When does the patient need to receive the medication? 01/13/23   Refill Delivery Questions    How will the patient receive the medication? MEDRx   When does the patient need to receive the medication? 01/13/23   Shipping Address Prescription   Address in [facility] confirmed and updated if neccessary? Yes   Expected Copay ($) 0   Is the patient able to afford the medication copay? Yes   Payment Method zero copay   Days supply of Refill 30   Supplies needed? No supplies needed   Refill activity completed? Yes   Refill activity plan Refill scheduled   Shipment/Pickup Date: 01/11/23            Current Outpatient Medications   Medication Sig    aspirin 81 MG Chew Take 81 mg by mouth once daily.    cetirizine (ZYRTEC) 10 MG tablet Take 10 mg by mouth once daily.    cholecalciferol, vitamin D3, (VITAMIN D3) 50 mcg (2,000 unit) Tab Take 1 tablet by mouth once daily.    dexlansoprazole (DEXILANT) 60 mg capsule Take 60 mg by mouth once daily.    fluticasone (FLONASE)  50 mcg/actuation nasal spray 1 spray by Each Nostril route daily as needed.    hydroCHLOROthiazide (HYDRODIURIL) 12.5 MG Tab Take 12.5 mg by mouth once daily.    lactobacillus combo #5 (PROVELLA) 150 mg (2 billion cell) TbEC Take 150 mg by mouth. Every 6 months    levothyroxine (SYNTHROID) 50 MCG tablet Take 50 mcg by mouth every morning.    lisinopriL 10 MG tablet TAKE 1 TABLET ORALLY ONCE A DAY.    loratadine (CLARITIN) 10 mg tablet 1 tablet    omega-3 fatty acids/fish oil (FISH OIL-OMEGA-3 FATTY ACIDS) 300-1,000 mg capsule Take 2 capsules by mouth once daily.    PROLIA 60 mg/mL Syrg SMARTSI Milligram(s) SUB-Q Twice a Year    rosuvastatin (CRESTOR) 10 MG tablet TAKE 1 TABLET BY MOUTH ONCE A DAY IN THE EVENING.    tenofovir disoproxil fumarate (VIREAD) 300 mg Tab Take 1 tablet (300 mg total) by mouth once daily.    vit C,X-Kc-bqzmn-lutein-zeaxan (PRESERVISION AREDS-2) 250-90-40-1 mg Cap Take 1 capsule by mouth Daily.   Last reviewed on 2022 10:04 AM by Kathryn Santoro LPN    Review of patient's allergies indicates:  No Known Allergies Last reviewed on  2022 9:36 AM by Kathryn Santoro      Tasks added this encounter   2023 - Refill Call (Auto Added)   Tasks due within next 3 months   No tasks due.     [facility]  [address] [place] [id number]  Phone: [phone]  Fax: [phone]

## 2023-02-02 ENCOUNTER — HOSPITAL ENCOUNTER (OUTPATIENT)
Dept: RADIOLOGY | Facility: HOSPITAL | Age: 73
Discharge: HOME | End: 2023-02-02
Attending: NURSE PRACTITIONER
Payer: MEDICARE

## 2023-02-02 VITALS — WEIGHT: 149 LBS | HEIGHT: 60 IN | BODY MASS INDEX: 29.25 KG/M2

## 2023-02-02 DIAGNOSIS — Z12.31 SCREENING MAMMOGRAM FOR BREAST CANCER: ICD-10-CM

## 2023-02-02 PROCEDURE — 77067 SCR MAMMO BI INCL CAD: CPT | Mod: TC

## 2023-02-06 ENCOUNTER — SPECIALTY PHARMACY (OUTPATIENT)
Dept: PHARMACY | Facility: CLINIC | Age: 73
End: 2023-02-06
Payer: MEDICARE

## 2023-02-06 NOTE — TELEPHONE ENCOUNTER
Specialty Pharmacy - Refill Coordination    Specialty Medication Orders Linked to Encounter      Flowsheet Row Most Recent Value   Medication #1 tenofovir disoproxil fumarate (VIREAD) 300 mg Tab (Order#[id number], Rx#[id number])            Refill Questions - Documented Responses      Flowsheet Row Most Recent Value   Patient Availability and HIPAA Verification    Does patient want to proceed with activity? Yes   HIPAA/medical authority confirmed? Yes   Relationship to patient of person spoken to? Self   Refill Screening Questions    Changes to allergies? No   Changes to medications? No   New conditions since last clinic visit? No   Unplanned office visit, urgent care, ED, or hospital admission in the last 4 weeks? No   How does patient/caregiver feel medication is working? Good   Financial problems or insurance changes? No   How many doses of your specialty medications were missed in the last 4 weeks? 0   Would patient like to speak to a pharmacist? No   When does the patient need to receive the medication? 02/11/23   Refill Delivery Questions    How will the patient receive the medication? MEDRx   When does the patient need to receive the medication? 02/11/23   Shipping Address Home   Address in [facility] confirmed and updated if neccessary? Yes   Expected Copay ($) 0   Is the patient able to afford the medication copay? Yes   Payment Method zero copay   Days supply of Refill 30   Supplies needed? No supplies needed   Refill activity completed? Yes   Refill activity plan Refill scheduled   Shipment/Pickup Date: 02/08/23            Current Outpatient Medications   Medication Sig    aspirin 81 MG Chew Take 81 mg by mouth once daily.    cetirizine (ZYRTEC) 10 MG tablet Take 10 mg by mouth once daily.    cholecalciferol, vitamin D3, (VITAMIN D3) 50 mcg (2,000 unit) Tab Take 1 tablet by mouth once daily.    dexlansoprazole (DEXILANT) 60 mg capsule Take 60 mg by mouth once daily.    fluticasone (FLONASE) 50  mcg/actuation nasal spray 1 spray by Each Nostril route daily as needed.    hydroCHLOROthiazide (HYDRODIURIL) 12.5 MG Tab Take 12.5 mg by mouth once daily.    lactobacillus combo #5 (PROVELLA) 150 mg (2 billion cell) TbEC Take 150 mg by mouth. Every 6 months    levothyroxine (SYNTHROID) 50 MCG tablet Take 50 mcg by mouth every morning.    lisinopriL 10 MG tablet TAKE 1 TABLET ORALLY ONCE A DAY.    loratadine (CLARITIN) 10 mg tablet 1 tablet    omega-3 fatty acids/fish oil (FISH OIL-OMEGA-3 FATTY ACIDS) 300-1,000 mg capsule Take 2 capsules by mouth once daily.    PROLIA 60 mg/mL Syrg SMARTSI Milligram(s) SUB-Q Twice a Year    rosuvastatin (CRESTOR) 10 MG tablet TAKE 1 TABLET BY MOUTH ONCE A DAY IN THE EVENING.    tenofovir disoproxil fumarate (VIREAD) 300 mg Tab Take 1 tablet (300 mg total) by mouth once daily.    vit C,W-Mu-xbprq-lutein-zeaxan (PRESERVISION AREDS-2) 250-90-40-1 mg Cap Take 1 capsule by mouth Daily.   Last reviewed on 2022 10:04 AM by Kathryn Santoro LPN    Review of patient's allergies indicates:  No Known Allergies Last reviewed on  2023 9:41 AM by Bridget Cadena      Tasks added this encounter   3/6/2023 - Refill Call (Auto Added)   Tasks due within next 3 months   No tasks due.     [facility] - Specialty Pharmacy  [address] [place] [id number]  Phone: [phone]  Fax: [phone]

## 2023-03-09 ENCOUNTER — SPECIALTY PHARMACY (OUTPATIENT)
Dept: PHARMACY | Facility: CLINIC | Age: 73
End: 2023-03-09
Payer: MEDICARE

## 2023-03-09 NOTE — TELEPHONE ENCOUNTER
Specialty Pharmacy - Refill Coordination    Specialty Medication Orders Linked to Encounter      Flowsheet Row Most Recent Value   Medication #1 tenofovir disoproxil fumarate (VIREAD) 300 mg Tab (Order#[id number], Rx#[id number])            Refill Questions - Documented Responses      Flowsheet Row Most Recent Value   Patient Availability and HIPAA Verification    Does patient want to proceed with activity? Yes   HIPAA/medical authority confirmed? Yes   Relationship to patient of person spoken to? Self   Refill Screening Questions    Changes to allergies? No   Changes to medications? No   New conditions since last clinic visit? No   Unplanned office visit, urgent care, ED, or hospital admission in the last 4 weeks? No   How does patient/caregiver feel medication is working? Good   Financial problems or insurance changes? No   How many doses of your specialty medications were missed in the last 4 weeks? 0   Would patient like to speak to a pharmacist? No   When does the patient need to receive the medication? 03/14/23   Refill Delivery Questions    How will the patient receive the medication? MEDRx   When does the patient need to receive the medication? 03/14/23   Shipping Address Prescription   Address in [facility] confirmed and updated if neccessary? Yes   Expected Copay ($) 0   Is the patient able to afford the medication copay? Yes   Payment Method zero copay   Days supply of Refill 30   Supplies needed? No supplies needed   Refill activity completed? Yes   Refill activity plan Refill scheduled   Shipment/Pickup Date: 03/13/23            Current Outpatient Medications   Medication Sig    aspirin 81 MG Chew Take 81 mg by mouth once daily.    cetirizine (ZYRTEC) 10 MG tablet Take 10 mg by mouth once daily.    cholecalciferol, vitamin D3, (VITAMIN D3) 50 mcg (2,000 unit) Tab Take 1 tablet by mouth once daily.    dexlansoprazole (DEXILANT) 60 mg capsule Take 60 mg by mouth once daily.    fluticasone (FLONASE)  50 mcg/actuation nasal spray 1 spray by Each Nostril route daily as needed.    hydroCHLOROthiazide (HYDRODIURIL) 12.5 MG Tab Take 12.5 mg by mouth once daily.    lactobacillus combo #5 (PROVELLA) 150 mg (2 billion cell) TbEC Take 150 mg by mouth. Every 6 months    levothyroxine (SYNTHROID) 50 MCG tablet Take 50 mcg by mouth every morning.    lisinopriL 10 MG tablet TAKE 1 TABLET ORALLY ONCE A DAY.    loratadine (CLARITIN) 10 mg tablet 1 tablet    omega-3 fatty acids/fish oil (FISH OIL-OMEGA-3 FATTY ACIDS) 300-1,000 mg capsule Take 2 capsules by mouth once daily.    PROLIA 60 mg/mL Syrg SMARTSI Milligram(s) SUB-Q Twice a Year    rosuvastatin (CRESTOR) 10 MG tablet TAKE 1 TABLET BY MOUTH ONCE A DAY IN THE EVENING.    tenofovir disoproxil fumarate (VIREAD) 300 mg Tab Take 1 tablet (300 mg total) by mouth once daily.    vit C,A-Ac-bzogx-lutein-zeaxan (PRESERVISION AREDS-2) 250-90-40-1 mg Cap Take 1 capsule by mouth Daily.   Last reviewed on 2022 10:04 AM by Kathryn Santoro LPN    Review of patient's allergies indicates:  No Known Allergies Last reviewed on  2023 9:41 AM by Bridget Cadena      Tasks added this encounter   2023 - Refill Call (Auto Added)   Tasks due within next 3 months   No tasks due.     Roz Mcdonald, PharmD  [facility] - Specialty Pharm[facility]  [address] [place] [id number]  Phone: [phone]  Fax: [phone]

## 2023-04-05 ENCOUNTER — SPECIALTY PHARMACY (OUTPATIENT)
Dept: PHARMACY | Facility: CLINIC | Age: 73
End: 2023-04-05
Payer: MEDICARE

## 2023-04-05 NOTE — TELEPHONE ENCOUNTER
Specialty Pharmacy - Refill Coordination    Specialty Medication Orders Linked to Encounter      Flowsheet Row Most Recent Value   Medication #1 tenofovir disoproxil fumarate (VIREAD) 300 mg Tab (Order#[id number], Rx#[id number])            Refill Questions - Documented Responses      Flowsheet Row Most Recent Value   Patient Availability and HIPAA Verification    Does patient want to proceed with activity? Yes   HIPAA/medical authority confirmed? Yes   Relationship to patient of person spoken to? Self   Refill Screening Questions    Changes to allergies? No   Changes to medications? No   New conditions since last clinic visit? No   Unplanned office visit, urgent care, ED, or hospital admission in the last 4 weeks? No   How does patient/caregiver feel medication is working? Good   Financial problems or insurance changes? No   How many doses of your specialty medications were missed in the last 4 weeks? 0   Would patient like to speak to a pharmacist? No   When does the patient need to receive the medication? 04/14/23   Refill Delivery Questions    How will the patient receive the medication? MEDRx   When does the patient need to receive the medication? 04/14/23   Shipping Address Prescription   Address in [facility] confirmed and updated if neccessary? Yes   Expected Copay ($) 0   Is the patient able to afford the medication copay? Yes   Payment Method zero copay   Days supply of Refill 30   Supplies needed? No supplies needed   Refill activity completed? Yes   Refill activity plan Refill scheduled   Shipment/Pickup Date: 04/10/23            Current Outpatient Medications   Medication Sig    aspirin 81 MG Chew Take 81 mg by mouth once daily.    cetirizine (ZYRTEC) 10 MG tablet Take 10 mg by mouth once daily.    cholecalciferol, vitamin D3, (VITAMIN D3) 50 mcg (2,000 unit) Tab Take 1 tablet by mouth once daily.    dexlansoprazole (DEXILANT) 60 mg capsule Take 60 mg by mouth once daily.    fluticasone (FLONASE)  50 mcg/actuation nasal spray 1 spray by Each Nostril route daily as needed.    hydroCHLOROthiazide (HYDRODIURIL) 12.5 MG Tab Take 12.5 mg by mouth once daily.    lactobacillus combo #5 (PROVELLA) 150 mg (2 billion cell) TbEC Take 150 mg by mouth. Every 6 months    levothyroxine (SYNTHROID) 50 MCG tablet Take 50 mcg by mouth every morning.    lisinopriL 10 MG tablet TAKE 1 TABLET ORALLY ONCE A DAY.    loratadine (CLARITIN) 10 mg tablet 1 tablet    omega-3 fatty acids/fish oil (FISH OIL-OMEGA-3 FATTY ACIDS) 300-1,000 mg capsule Take 2 capsules by mouth once daily.    PROLIA 60 mg/mL Syrg SMARTSI Milligram(s) SUB-Q Twice a Year    rosuvastatin (CRESTOR) 10 MG tablet TAKE 1 TABLET BY MOUTH ONCE A DAY IN THE EVENING.    tenofovir disoproxil fumarate (VIREAD) 300 mg Tab Take 1 tablet (300 mg total) by mouth once daily.    vit C,A-Wr-dasdh-lutein-zeaxan (PRESERVISION AREDS-2) 250-90-40-1 mg Cap Take 1 capsule by mouth Daily.   Last reviewed on 2022 10:04 AM by Kathryn Santoro LPN    Review of patient's allergies indicates:  No Known Allergies Last reviewed on  2023 9:41 AM by Bridget Cadena      Tasks added this encounter   2023 - Refill Call (Auto Added)   Tasks due within next 3 months   No tasks due.     [facility]  [address] [place], [place] [address]  [facility] [address]  Phone: [phone]  Fax: [phone]

## 2023-05-10 ENCOUNTER — SPECIALTY PHARMACY (OUTPATIENT)
Dept: PHARMACY | Facility: CLINIC | Age: 73
End: 2023-05-10
Payer: MEDICARE

## 2023-05-10 NOTE — TELEPHONE ENCOUNTER
Specialty Pharmacy - Refill Coordination    Specialty Medication Orders Linked to Encounter      Flowsheet Row Most Recent Value   Medication #1 tenofovir disoproxil fumarate (VIREAD) 300 mg Tab (Order#[id number], Rx#[id number])            Refill Questions - Documented Responses      Flowsheet Row Most Recent Value   Refill Screening Questions    Changes to allergies? No   Changes to medications? No   New conditions since last clinic visit? No   How does patient/caregiver feel medication is working? Good   Financial problems or insurance changes? No   How many doses of your specialty medications were missed in the last 4 weeks? 0   Would patient like to speak to a pharmacist? No   When does the patient need to receive the medication? 05/17/23   Refill Delivery Questions    How will the patient receive the medication? MEDRx   When does the patient need to receive the medication? 05/17/23   Shipping Address Prescription   Address in [facility] confirmed and updated if neccessary? Yes   Expected Copay ($) 0   Is the patient able to afford the medication copay? Yes   Payment Method zero copay   Days supply of Refill 30   Supplies needed? No supplies needed   Refill activity completed? Yes   Refill activity plan Refill scheduled   Shipment/Pickup Date: 05/15/23            Current Outpatient Medications   Medication Sig    aspirin 81 MG Chew Take 81 mg by mouth once daily.    cetirizine (ZYRTEC) 10 MG tablet Take 10 mg by mouth once daily.    cholecalciferol, vitamin D3, (VITAMIN D3) 50 mcg (2,000 unit) Tab Take 1 tablet by mouth once daily.    dexlansoprazole (DEXILANT) 60 mg capsule Take 60 mg by mouth once daily.    fluticasone (FLONASE) 50 mcg/actuation nasal spray 1 spray by Each Nostril route daily as needed.    hydroCHLOROthiazide (HYDRODIURIL) 12.5 MG Tab Take 12.5 mg by mouth once daily.    lactobacillus combo #5 (PROVELLA) 150 mg (2 billion cell) TbEC Take 150 mg by mouth. Every 6 months    levothyroxine  (SYNTHROID) 50 MCG tablet Take 50 mcg by mouth every morning.    lisinopriL 10 MG tablet TAKE 1 TABLET ORALLY ONCE A DAY.    loratadine (CLARITIN) 10 mg tablet 1 tablet    omega-3 fatty acids/fish oil (FISH OIL-OMEGA-3 FATTY ACIDS) 300-1,000 mg capsule Take 2 capsules by mouth once daily.    PROLIA 60 mg/mL Syrg SMARTSI Milligram(s) SUB-Q Twice a Year    rosuvastatin (CRESTOR) 10 MG tablet TAKE 1 TABLET BY MOUTH ONCE A DAY IN THE EVENING.    tenofovir disoproxil fumarate (VIREAD) 300 mg Tab Take 1 tablet (300 mg total) by mouth once daily.    vit C,G-Ys-vdaip-lutein-zeaxan (PRESERVISION AREDS-2) 250-90-40-1 mg Cap Take 1 capsule by mouth Daily.   Last reviewed on 2022 10:04 AM by Kathryn Santoro LPN    Review of patient's allergies indicates:  No Known Allergies Last reviewed on  2023 9:41 AM by Bridget Cadena      Tasks added this encounter   No tasks added.   Tasks due within next 3 months   No tasks due.     Marty Grossman, PharmD  [facility]  [address] [facility]  [facility] [address]  Phone: [phone]  Fax: [phone]

## 2023-05-22 ENCOUNTER — HOSPITAL ENCOUNTER (OUTPATIENT)
Dept: RADIOLOGY | Facility: HOSPITAL | Age: 73
Discharge: HOME | End: 2023-05-22
Attending: PHYSICIAN ASSISTANT
Payer: MEDICARE

## 2023-05-22 DIAGNOSIS — B18.1 CHRONIC HEPATITIS B: ICD-10-CM

## 2023-05-22 PROCEDURE — 76705 ECHO EXAM OF ABDOMEN: CPT | Mod: TC

## 2023-06-13 ENCOUNTER — SPECIALTY PHARMACY (OUTPATIENT)
Dept: PHARMACY | Facility: CLINIC | Age: 73
End: 2023-06-13
Payer: MEDICARE

## 2023-06-13 NOTE — TELEPHONE ENCOUNTER
Specialty Pharmacy - Refill Coordination    Specialty Medication Orders Linked to Encounter      Flowsheet Row Most Recent Value   Medication #1 tenofovir disoproxil fumarate (VIREAD) 300 mg Tab (Order#[id number], Rx#[id number])            Refill Questions - Documented Responses      Flowsheet Row Most Recent Value   Patient Availability and HIPAA Verification    Does patient want to proceed with activity? Yes   HIPAA/medical authority confirmed? Yes   Relationship to patient of person spoken to? Self   Refill Screening Questions    Changes to allergies? No   Changes to medications? No   New conditions since last clinic visit? No   Unplanned office visit, urgent care, ED, or hospital admission in the last 4 weeks? No   How does patient/caregiver feel medication is working? Good   Financial problems or insurance changes? No   How many doses of your specialty medications were missed in the last 4 weeks? 0   Would patient like to speak to a pharmacist? No   When does the patient need to receive the medication? 06/17/23   Refill Delivery Questions    How will the patient receive the medication? MEDRx   When does the patient need to receive the medication? 06/17/23   Shipping Address Prescription   Address in [facility] confirmed and updated if neccessary? Yes   Expected Copay ($) 0   Is the patient able to afford the medication copay? Yes   Payment Method zero copay   Days supply of Refill 30   Supplies needed? No supplies needed   Refill activity completed? Yes   Refill activity plan Refill scheduled   Shipment/Pickup Date: 06/14/23            Current Outpatient Medications   Medication Sig    aspirin 81 MG Chew Take 81 mg by mouth once daily.    cetirizine (ZYRTEC) 10 MG tablet Take 10 mg by mouth once daily.    cholecalciferol, vitamin D3, (VITAMIN D3) 50 mcg (2,000 unit) Tab Take 1 tablet by mouth once daily.    dexlansoprazole (DEXILANT) 60 mg capsule Take 60 mg by mouth once daily.    fluticasone (FLONASE)  50 mcg/actuation nasal spray 1 spray by Each Nostril route daily as needed.    hydroCHLOROthiazide (HYDRODIURIL) 12.5 MG Tab Take 12.5 mg by mouth once daily.    lactobacillus combo #5 (PROVELLA) 150 mg (2 billion cell) TbEC Take 150 mg by mouth. Every 6 months    levothyroxine (SYNTHROID) 50 MCG tablet Take 50 mcg by mouth every morning.    lisinopriL 10 MG tablet TAKE 1 TABLET ORALLY ONCE A DAY.    loratadine (CLARITIN) 10 mg tablet 1 tablet    meloxicam (MOBIC) 7.5 MG tablet 1 tablet Orally TWICE A DAY    omega-3 fatty acids/fish oil (FISH OIL-OMEGA-3 FATTY ACIDS) 300-1,000 mg capsule Take 2 capsules by mouth once daily.    PROLIA 60 mg/mL Syrg SMARTSI Milligram(s) SUB-Q Twice a Year    rosuvastatin (CRESTOR) 10 MG tablet TAKE 1 TABLET BY MOUTH ONCE A DAY IN THE EVENING.    solifenacin (VESICARE) 5 MG tablet Take 5 mg by mouth.    tenofovir disoproxil fumarate (VIREAD) 300 mg Tab Take 1 tablet (300 mg total) by mouth once daily.    vit C,R-Et-vjmlw-lutein-zeaxan (PRESERVISION AREDS-2) 250-90-40-1 mg Cap Take 1 capsule by mouth Daily.   Last reviewed on 2023  9:14 AM by Kathryn Santoro LPN    Review of patient's allergies indicates:  No Known Allergies Last reviewed on  2023 9:11 AM by Kathryn Santoro      Tasks added this encounter   No tasks added.   Tasks due within next 3 months   No tasks due.     Carole Thompson, PharmD  [facility] - Specialty Pharmacy  [address] [place] [address]  Phone: [phone]  Fax: [phone]

## 2023-07-07 ENCOUNTER — SPECIALTY PHARMACY (OUTPATIENT)
Dept: PHARMACY | Facility: CLINIC | Age: 73
End: 2023-07-07
Payer: MEDICARE

## 2023-07-07 NOTE — TELEPHONE ENCOUNTER
Specialty Pharmacy - Refill Coordination    Specialty Medication Orders Linked to Encounter      Flowsheet Row Most Recent Value   Medication #1 tenofovir disoproxil fumarate (VIREAD) 300 mg Tab (Order#[id number], Rx#[id number])            Refill Questions - Documented Responses      Flowsheet Row Most Recent Value   Patient Availability and HIPAA Verification    Does patient want to proceed with activity? Yes   HIPAA/medical authority confirmed? Yes   Relationship to patient of person spoken to? Self   Refill Screening Questions    Changes to allergies? No   Changes to medications? No   New conditions since last clinic visit? No   Unplanned office visit, urgent care, ED, or hospital admission in the last 4 weeks? No   How does patient/caregiver feel medication is working? Good   Financial problems or insurance changes? No   How many doses of your specialty medications were missed in the last 4 weeks? 0   Would patient like to speak to a pharmacist? No   When does the patient need to receive the medication? 07/14/23   Refill Delivery Questions    How will the patient receive the medication? MEDRx   When does the patient need to receive the medication? 07/14/23   Shipping Address Prescription   Address in [facility] confirmed and updated if neccessary? Yes   Expected Copay ($) 0   Is the patient able to afford the medication copay? Yes   Payment Method zero copay   Days supply of Refill 30   Refill activity completed? Yes   Refill activity plan Refill scheduled   Shipment/Pickup Date: 07/11/23            Current Outpatient Medications   Medication Sig    aspirin 81 MG Chew Take 81 mg by mouth once daily.    cetirizine (ZYRTEC) 10 MG tablet Take 10 mg by mouth once daily.    cholecalciferol, vitamin D3, (VITAMIN D3) 50 mcg (2,000 unit) Tab Take 1 tablet by mouth once daily.    dexlansoprazole (DEXILANT) 60 mg capsule Take 60 mg by mouth once daily.    fluticasone (FLONASE) 50 mcg/actuation nasal spray 1 spray  by Each Nostril route daily as needed.    hydroCHLOROthiazide (HYDRODIURIL) 12.5 MG Tab Take 12.5 mg by mouth once daily.    lactobacillus combo #5 (PROVELLA) 150 mg (2 billion cell) TbEC Take 150 mg by mouth. Every 6 months    levothyroxine (SYNTHROID) 50 MCG tablet Take 50 mcg by mouth every morning.    lisinopriL 10 MG tablet TAKE 1 TABLET ORALLY ONCE A DAY.    loratadine (CLARITIN) 10 mg tablet 1 tablet    meloxicam (MOBIC) 7.5 MG tablet 1 tablet Orally TWICE A DAY    omega-3 fatty acids/fish oil (FISH OIL-OMEGA-3 FATTY ACIDS) 300-1,000 mg capsule Take 2 capsules by mouth once daily.    PROLIA 60 mg/mL Syrg SMARTSI Milligram(s) SUB-Q Twice a Year    rosuvastatin (CRESTOR) 10 MG tablet TAKE 1 TABLET BY MOUTH ONCE A DAY IN THE EVENING.    solifenacin (VESICARE) 5 MG tablet Take 5 mg by mouth.    tenofovir disoproxil fumarate (VIREAD) 300 mg Tab Take 1 tablet (300 mg total) by mouth once daily.    vit C,Y-Iy-ljhem-lutein-zeaxan (PRESERVISION AREDS-2) 250-90-40-1 mg Cap Take 1 capsule by mouth Daily.   Last reviewed on 2023  9:14 AM by Kathryn Santoro LPN    Review of patient's allergies indicates:  No Known Allergies Last reviewed on  2023 9:11 AM by Kathryn Santoro      Tasks added this encounter   No tasks added.   Tasks due within next 3 months   No tasks due.     Tabitha Chapa  [facility] - Specialty Pharmacy  [address] [place] [id number]  Phone: [phone]  Fax: [phone]

## 2023-08-07 ENCOUNTER — SPECIALTY PHARMACY (OUTPATIENT)
Dept: PHARMACY | Facility: CLINIC | Age: 73
End: 2023-08-07
Payer: MEDICARE

## 2023-08-07 NOTE — TELEPHONE ENCOUNTER
Specialty Pharmacy - Refill Coordination    Specialty Medication Orders Linked to Encounter      Flowsheet Row Most Recent Value   Medication #1 tenofovir disoproxil fumarate (VIREAD) 300 mg Tab (Order#[id number], Rx#[id number])            Refill Questions - Documented Responses      Flowsheet Row Most Recent Value   Refill Screening Questions    Changes to allergies? No   Changes to medications? No   New conditions since last clinic visit? No   Unplanned office visit, urgent care, ED, or hospital admission in the last 4 weeks? No   How does patient/caregiver feel medication is working? Good   Financial problems or insurance changes? No   How many doses of your specialty medications were missed in the last 4 weeks? 0   Would patient like to speak to a pharmacist? No   When does the patient need to receive the medication? 08/14/23   Refill Delivery Questions    How will the patient receive the medication? MEDRx   When does the patient need to receive the medication? 08/14/23   Shipping Address Home   Address in [facility] confirmed and updated if neccessary? Yes   Expected Copay ($) 0   Is the patient able to afford the medication copay? Yes   Payment Method zero copay   Days supply of Refill 30   Supplies needed? No supplies needed   Refill activity completed? Yes   Refill activity plan Refill scheduled   Shipment/Pickup Date: 08/10/23            Current Outpatient Medications   Medication Sig    aspirin 81 MG Chew Take 81 mg by mouth once daily.    cetirizine (ZYRTEC) 10 MG tablet Take 10 mg by mouth once daily.    cholecalciferol, vitamin D3, (VITAMIN D3) 50 mcg (2,000 unit) Tab Take 1 tablet by mouth once daily.    dexlansoprazole (DEXILANT) 60 mg capsule Take 1 capsule (60 mg total) by mouth 2 (two) times a day.    fluticasone (FLONASE) 50 mcg/actuation nasal spray 1 spray by Each Nostril route daily as needed.    hydroCHLOROthiazide (HYDRODIURIL) 12.5 MG Tab Take 12.5 mg by mouth once daily.     lactobacillus combo #5 (PROVELLA) 150 mg (2 billion cell) TbEC Take 150 mg by mouth. Every 6 months    levothyroxine (SYNTHROID) 50 MCG tablet Take 50 mcg by mouth every morning.    lisinopriL 10 MG tablet TAKE 1 TABLET ORALLY ONCE A DAY.    loratadine (CLARITIN) 10 mg tablet 1 tablet    meloxicam (MOBIC) 7.5 MG tablet 1 tablet Orally TWICE A DAY    omega-3 fatty acids/fish oil (FISH OIL-OMEGA-3 FATTY ACIDS) 300-1,000 mg capsule Take 2 capsules by mouth once daily.    PROLIA 60 mg/mL Syrg SMARTSI Milligram(s) SUB-Q Twice a Year    rosuvastatin (CRESTOR) 10 MG tablet TAKE 1 TABLET BY MOUTH ONCE A DAY IN THE EVENING.    solifenacin (VESICARE) 5 MG tablet Take 5 mg by mouth.    tenofovir disoproxil fumarate (VIREAD) 300 mg Tab Take 1 tablet (300 mg total) by mouth once daily.    vit C,K-Qw-epsal-lutein-zeaxan (PRESERVISION AREDS-2) 250-90-40-1 mg Cap Take 1 capsule by mouth Daily.   Last reviewed on 2023 12:26 PM by Babs Morgan CRNA    Review of patient's allergies indicates:  No Known Allergies Last reviewed on  2023 12:25 PM by Babs Morgan      Tasks added this encounter   No tasks added.   Tasks due within next 3 months   2023 - Refill Coordination Outreach (1 time occurrence)     Sammie Harrington [facility] - Specialty Pharmacy  [address] [place] [id number]  Phone: [phone]  Fax: [phone]

## 2023-12-01 ENCOUNTER — HOSPITAL ENCOUNTER (OUTPATIENT)
Dept: RADIOLOGY | Facility: HOSPITAL | Age: 73
Discharge: HOME | End: 2023-12-01
Attending: PHYSICIAN ASSISTANT
Payer: MEDICARE

## 2023-12-01 DIAGNOSIS — B18.1 CHRONIC HEPATITIS B: ICD-10-CM

## 2023-12-01 DIAGNOSIS — Z79.899 LONG-TERM USE OF HIGH-RISK MEDICATION: ICD-10-CM

## 2023-12-01 PROCEDURE — 76705 ECHO EXAM OF ABDOMEN: CPT | Mod: TC

## 2023-12-04 ENCOUNTER — OFFICE VISIT (OUTPATIENT)
Dept: OBSTETRICS AND GYNECOLOGY | Facility: CLINIC | Age: 73
End: 2023-12-04
Payer: MEDICARE

## 2023-12-04 VITALS
DIASTOLIC BLOOD PRESSURE: 66 MMHG | BODY MASS INDEX: 29.69 KG/M2 | WEIGHT: 152 LBS | HEART RATE: 71 BPM | SYSTOLIC BLOOD PRESSURE: 124 MMHG

## 2023-12-04 DIAGNOSIS — Z01.419 ENCOUNTER FOR ANNUAL ROUTINE GYNECOLOGICAL EXAMINATION: Primary | ICD-10-CM

## 2023-12-04 DIAGNOSIS — K76.6 PORTAL HYPERTENSION: ICD-10-CM

## 2023-12-04 DIAGNOSIS — Z90.710 HISTORY OF HYSTERECTOMY: ICD-10-CM

## 2023-12-04 PROCEDURE — 1160F PR REVIEW ALL MEDS BY PRESCRIBER/CLIN PHARMACIST DOCUMENTED: ICD-10-PCS | Mod: CPTII,S$GLB,, | Performed by: OBSTETRICS & GYNECOLOGY

## 2023-12-04 PROCEDURE — 99999 PR PBB SHADOW E&M-EST. PATIENT-LVL III: CPT | Mod: PBBFAC,,, | Performed by: OBSTETRICS & GYNECOLOGY

## 2023-12-04 PROCEDURE — 99999 PR PBB SHADOW E&M-EST. PATIENT-LVL III: ICD-10-PCS | Mod: PBBFAC,,, | Performed by: OBSTETRICS & GYNECOLOGY

## 2023-12-04 PROCEDURE — 3078F DIAST BP <80 MM HG: CPT | Mod: CPTII,S$GLB,, | Performed by: OBSTETRICS & GYNECOLOGY

## 2023-12-04 PROCEDURE — 1160F RVW MEDS BY RX/DR IN RCRD: CPT | Mod: CPTII,S$GLB,, | Performed by: OBSTETRICS & GYNECOLOGY

## 2023-12-04 PROCEDURE — 3074F PR MOST RECENT SYSTOLIC BLOOD PRESSURE < 130 MM HG: ICD-10-PCS | Mod: CPTII,S$GLB,, | Performed by: OBSTETRICS & GYNECOLOGY

## 2023-12-04 PROCEDURE — G0101 CA SCREEN;PELVIC/BREAST EXAM: HCPCS | Mod: S$GLB,,, | Performed by: OBSTETRICS & GYNECOLOGY

## 2023-12-04 PROCEDURE — 1159F MED LIST DOCD IN RCRD: CPT | Mod: CPTII,S$GLB,, | Performed by: OBSTETRICS & GYNECOLOGY

## 2023-12-04 PROCEDURE — 3078F PR MOST RECENT DIASTOLIC BLOOD PRESSURE < 80 MM HG: ICD-10-PCS | Mod: CPTII,S$GLB,, | Performed by: OBSTETRICS & GYNECOLOGY

## 2023-12-04 PROCEDURE — 1159F PR MEDICATION LIST DOCUMENTED IN MEDICAL RECORD: ICD-10-PCS | Mod: CPTII,S$GLB,, | Performed by: OBSTETRICS & GYNECOLOGY

## 2023-12-04 PROCEDURE — 3074F SYST BP LT 130 MM HG: CPT | Mod: CPTII,S$GLB,, | Performed by: OBSTETRICS & GYNECOLOGY

## 2023-12-04 PROCEDURE — G0101 PR CA SCREEN;PELVIC/BREAST EXAM: ICD-10-PCS | Mod: S$GLB,,, | Performed by: OBSTETRICS & GYNECOLOGY

## 2023-12-04 NOTE — PROGRESS NOTES
Subjective:    Patient ID: Connie Sanchez is a 73 y.o. female.     Chief Complaint: Annual Well Woman Exam     History of Present Illness:  Connie presents today for Annual Well Woman exam. .No LMP recorded. Patient has had a hysterectomy.. She is currently using no hormone therapy and she reports no problems with hot flashes, night sweats, irritability or vaginal dryness. She denies breast tenderness, denies masses, denies nipple discharge. She denies difficulty with urination. Bowel movements have not significantly changed.       Menstrual History:   No LMP recorded. Patient has had a hysterectomy..     OB History          1    Para   1    Term   1            AB        Living             SAB        IAB        Ectopic        Multiple        Live Births                     Review of Systems   Constitutional:  Negative for chills, fatigue and fever.   Respiratory:  Negative for shortness of breath.    Cardiovascular:  Negative for chest pain.   Gastrointestinal:  Negative for abdominal pain, constipation, diarrhea and nausea.   Genitourinary:  Negative for bladder incontinence, dysuria, hot flashes, pelvic pain and vaginal bleeding.   Neurological:  Negative for headaches.   Psychiatric/Behavioral:  Negative for depression.          Objective:    Vital Signs:  Vitals:    23 1409   BP: 124/66   Pulse: 71       Physical Exam:  General:  alert, no distress   Skin:  Skin color, texture, turgor normal. No rashes or lesions   HEENT:  conjunctivae/corneas clear. PERRL.   Neck: supple, trachea midline, no adenopathy or thyromegaly   Respiratory:  clear to auscultation bilaterally   Heart:  regular rate and rhythm, S1, S2 normal, no murmur, click, rub or gallop   Breasts: Left Breast: Nipple protruding. No nipple discharge. No palpable masses, erythema, skin changes, tenderness, or adenopathy.  Right Breast: Nipple protruding. No nipple discharge. No palpable masses, erythema, skin changes,  tenderness, or adenopathy.   Abdomen:  Soft, non-tender. Bowel sounds normal. No masses,  no organomegaly   Pelvis: External genitalia: normal general appearance  Urinary system: urethral meatus normal, bladder nontender  Vaginal: atrophic mucosa  Cervix: removed surgically  Uterus: removed surgically  Adnexa: normal bimanual exam   Extremities: Normal ROM; no edema, no cyanosis   Neurological: Normal strength and tone. No focal numbness or weakness. Reflexes 2+ and equal.   Psychiatric: normal mood, speech, dress, and thought processes     Concerned that her thyroid may not be functioning right. Was on 100 mcg and was decreased to 75 mcg. States may not be enough. Explained that she needs to f/u with her PCP in January as scheduled. Informed her that there is a dose in between of 88 mcg that might be just what she needs for symptom control        Assessment:      1. Encounter for annual routine gynecological examination    2. History of hysterectomy    3. Portal hypertension          Plan:      Encounter for annual routine gynecological examination    History of hysterectomy    Portal hypertension        Health Maintenance and Screening:   Connie was counseled on A.C.O.G. Pap guidelines and recommendations for yearly pelvic exams in addition to recommendations for yearly mammograms and monthly self breast exams, and adequate calcium and vitamin D in her diet.        Sherin Amador MD, FACOG   12/04/2023 2:19 PM

## 2024-02-07 ENCOUNTER — HOSPITAL ENCOUNTER (OUTPATIENT)
Dept: RADIOLOGY | Facility: HOSPITAL | Age: 74
Discharge: HOME | End: 2024-02-07
Attending: NURSE PRACTITIONER
Payer: MEDICARE

## 2024-02-07 VITALS — HEIGHT: 60 IN | BODY MASS INDEX: 29.64 KG/M2 | WEIGHT: 151 LBS

## 2024-02-07 DIAGNOSIS — Z12.31 ENCOUNTER FOR SCREENING MAMMOGRAM FOR MALIGNANT NEOPLASM OF BREAST: ICD-10-CM

## 2024-02-07 DIAGNOSIS — E55.9 VITAMIN D DEFICIENCY: ICD-10-CM

## 2024-02-07 DIAGNOSIS — M81.8 OTHER OSTEOPOROSIS WITHOUT CURRENT PATHOLOGICAL FRACTURE: ICD-10-CM

## 2024-02-07 PROCEDURE — 77080 DXA BONE DENSITY AXIAL: CPT | Mod: TC

## 2024-02-07 PROCEDURE — 77067 SCR MAMMO BI INCL CAD: CPT | Mod: TC

## 2024-06-10 ENCOUNTER — LAB VISIT (OUTPATIENT)
Dept: LAB | Facility: HOSPITAL | Age: 74
End: 2024-06-10
Payer: MEDICARE

## 2024-06-10 DIAGNOSIS — B18.1 CHRONIC HEPATITIS B: ICD-10-CM

## 2024-06-10 LAB
ALBUMIN SERPL BCP-MCNC: 3.6 G/DL (ref 3.5–5.2)
ALP SERPL-CCNC: 56 U/L (ref 55–135)
ALT SERPL W/O P-5'-P-CCNC: 15 U/L (ref 10–44)
ANION GAP SERPL CALC-SCNC: 3 MMOL/L (ref 3–11)
AST SERPL-CCNC: 8 U/L (ref 10–40)
BILIRUB SERPL-MCNC: 0.6 MG/DL (ref 0.1–1)
BUN SERPL-MCNC: 19 MG/DL (ref 8–23)
CALCIUM SERPL-MCNC: 9.3 MG/DL (ref 8.7–10.5)
CHLORIDE SERPL-SCNC: 105 MMOL/L (ref 95–110)
CO2 SERPL-SCNC: 32 MMOL/L (ref 23–29)
CREAT SERPL-MCNC: 0.6 MG/DL (ref 0.5–1.4)
ERYTHROCYTE [DISTWIDTH] IN BLOOD BY AUTOMATED COUNT: 13.2 % (ref 11.5–14.5)
EST. GFR  (NO RACE VARIABLE): >60 ML/MIN/1.73 M^2
GLUCOSE SERPL-MCNC: 98 MG/DL (ref 70–110)
HBV SURFACE AB SER-ACNC: <3 MIU/ML
HBV SURFACE AB SER-ACNC: NORMAL M[IU]/ML
HBV SURFACE AG SERPL QL IA: REACTIVE
HBV SURFACE AG SERPL QL NT: ABNORMAL
HCT VFR BLD AUTO: 43.4 % (ref 37–48.5)
HGB BLD-MCNC: 14.7 G/DL (ref 12–16)
MCH RBC QN AUTO: 30.9 PG (ref 27–31)
MCHC RBC AUTO-ENTMCNC: 33.9 G/DL (ref 32–36)
MCV RBC AUTO: 91 FL (ref 82–98)
PLATELET # BLD AUTO: 237 K/UL (ref 150–450)
PMV BLD AUTO: 10 FL (ref 9.2–12.9)
POTASSIUM SERPL-SCNC: 3.5 MMOL/L (ref 3.5–5.1)
PROT SERPL-MCNC: 7.4 G/DL (ref 6–8.4)
RBC # BLD AUTO: 4.75 M/UL (ref 4–5.4)
SODIUM SERPL-SCNC: 140 MMOL/L (ref 136–145)
WBC # BLD AUTO: 6.91 K/UL (ref 3.9–12.7)

## 2024-06-10 PROCEDURE — 86707 HEPATITIS BE ANTIBODY: CPT | Performed by: PHYSICIAN ASSISTANT

## 2024-06-10 PROCEDURE — 87517 HEPATITIS B DNA QUANT: CPT | Performed by: PHYSICIAN ASSISTANT

## 2024-06-10 PROCEDURE — 85027 COMPLETE CBC AUTOMATED: CPT | Performed by: PHYSICIAN ASSISTANT

## 2024-06-10 PROCEDURE — 80053 COMPREHEN METABOLIC PANEL: CPT | Performed by: PHYSICIAN ASSISTANT

## 2024-06-10 PROCEDURE — 87340 HEPATITIS B SURFACE AG IA: CPT | Performed by: PHYSICIAN ASSISTANT

## 2024-06-10 PROCEDURE — 86706 HEP B SURFACE ANTIBODY: CPT | Performed by: PHYSICIAN ASSISTANT

## 2024-06-10 PROCEDURE — 87341 HEP B SURFACE AG NEUTRLZJ IA: CPT | Performed by: PHYSICIAN ASSISTANT

## 2024-06-11 LAB
HEPATITIS B VIRUS DNA: ABNORMAL
HEPATITIS B VIRUS LOG (IU/ML): <1 LOGIU/ML
HEPATITIS B VIRUS PCR, QUANT: <10 IU/ML

## 2024-06-13 LAB — HBV E AB SER QL: REACTIVE

## 2024-07-02 ENCOUNTER — HOSPITAL ENCOUNTER (OUTPATIENT)
Dept: RADIOLOGY | Facility: HOSPITAL | Age: 74
Discharge: HOME | End: 2024-07-02
Attending: PHYSICIAN ASSISTANT
Payer: MEDICARE

## 2024-07-02 DIAGNOSIS — B18.1 CHRONIC HEPATITIS B: ICD-10-CM

## 2024-07-02 PROCEDURE — 76705 ECHO EXAM OF ABDOMEN: CPT | Mod: TC

## 2024-12-06 ENCOUNTER — OFFICE VISIT (OUTPATIENT)
Dept: OBSTETRICS AND GYNECOLOGY | Facility: CLINIC | Age: 74
End: 2024-12-06
Payer: MEDICARE

## 2024-12-06 VITALS
SYSTOLIC BLOOD PRESSURE: 148 MMHG | HEART RATE: 68 BPM | WEIGHT: 153 LBS | DIASTOLIC BLOOD PRESSURE: 78 MMHG | HEIGHT: 59 IN | BODY MASS INDEX: 30.84 KG/M2

## 2024-12-06 DIAGNOSIS — Z01.419 ENCOUNTER FOR ANNUAL ROUTINE GYNECOLOGICAL EXAMINATION: Primary | ICD-10-CM

## 2024-12-06 DIAGNOSIS — K76.6 PORTAL HYPERTENSION: ICD-10-CM

## 2024-12-06 DIAGNOSIS — Z90.710 HISTORY OF HYSTERECTOMY: ICD-10-CM

## 2024-12-06 PROCEDURE — 99999 PR PBB SHADOW E&M-EST. PATIENT-LVL III: CPT | Mod: PBBFAC,,, | Performed by: OBSTETRICS & GYNECOLOGY

## 2024-12-06 NOTE — PROGRESS NOTES
Subjective:    Patient ID: Connie Sanchez is a 74 y.o. female.     Chief Complaint: Annual Well Woman Exam     History of Present Illness:  Connie presents today for Annual Well Woman exam. .No LMP recorded. Patient has had a hysterectomy.. She is currently using no hormone therapy and she reports problems with Hot flashes. She denies breast tenderness, denies masses, denies nipple discharge. She denies difficulty with urination. Bowel movements have not significantly changed.       Menstrual History:   No LMP recorded. Patient has had a hysterectomy..     OB History          1    Para   1    Term   1            AB        Living             SAB        IAB        Ectopic        Multiple        Live Births                     Review of Systems   Constitutional:  Negative for chills, fatigue and fever.   Respiratory:  Negative for shortness of breath.    Cardiovascular:  Negative for chest pain.   Gastrointestinal:  Negative for abdominal pain, constipation, diarrhea and nausea.   Genitourinary:  Positive for hot flashes. Negative for bladder incontinence, dysuria, pelvic pain and vaginal bleeding.   Neurological:  Negative for headaches.   Psychiatric/Behavioral:  Negative for depression.          Objective:    Vital Signs:  Vitals:    24 0917   BP: (!) 148/78   Pulse: 68       Physical Exam:  General:  alert, no distress   Skin:  Skin color, texture, turgor normal. No rashes or lesions   HEENT:  conjunctivae/corneas clear. PERRL.   Neck: supple, trachea midline, no adenopathy or thyromegaly   Respiratory:  clear to auscultation bilaterally   Heart:  regular rate and rhythm, S1, S2 normal, no murmur, click, rub or gallop   Breasts: Left Breast: Nipple protruding. No nipple discharge. No palpable masses, erythema, skin changes, tenderness, or adenopathy.  Right Breast: Nipple protruding. No nipple discharge. No palpable masses, erythema, skin changes, tenderness, or adenopathy.   Abdomen:   Soft, non-tender. Bowel sounds normal. No masses,  no organomegaly   Pelvis: External genitalia: normal general appearance  Urinary system: urethral meatus normal, bladder nontender  Vaginal: atrophic mucosa  Cervix: removed surgically  Uterus: removed surgically  Adnexa: normal bimanual exam   Extremities: Normal ROM; no edema, no cyanosis   Neurological: Normal strength and tone. No focal numbness or weakness. Reflexes 2+ and equal.   Psychiatric: normal mood, speech, dress, and thought processes             Assessment:      1. Encounter for annual routine gynecological examination    2. History of hysterectomy    3. Portal hypertension          Plan:      Encounter for annual routine gynecological examination    History of hysterectomy    Portal hypertension        Health Maintenance and Screening:   Connie was counseled on A.C.O.G. Pap guidelines and recommendations for yearly pelvic exams in addition to recommendations for yearly mammograms and monthly self breast exams, and adequate calcium and vitamin D in her diet.     A discussion of needed Health Maintenance Screening was done with Connie. She was counseled that she is overdue for Mammogram: She will schedule: later (ordered by PCP).     Sherin Amador MD, FACOG   12/06/2024 9:23 AM

## 2024-12-11 ENCOUNTER — HOSPITAL ENCOUNTER (OUTPATIENT)
Dept: RADIOLOGY | Facility: HOSPITAL | Age: 74
Discharge: HOME | End: 2024-12-11
Attending: PHYSICIAN ASSISTANT
Payer: MEDICARE

## 2024-12-11 DIAGNOSIS — B18.1 CHRONIC HEPATITIS B: ICD-10-CM

## 2024-12-11 PROCEDURE — 76705 ECHO EXAM OF ABDOMEN: CPT | Mod: TC

## 2025-01-15 DIAGNOSIS — R05.3 CHRONIC COUGH: Primary | ICD-10-CM

## 2025-01-27 ENCOUNTER — HOSPITAL ENCOUNTER (OUTPATIENT)
Dept: RADIOLOGY | Facility: HOSPITAL | Age: 75
Discharge: HOME | End: 2025-01-27
Attending: NURSE PRACTITIONER
Payer: MEDICARE

## 2025-01-27 ENCOUNTER — HOSPITAL ENCOUNTER (OUTPATIENT)
Dept: PULMONOLOGY | Facility: HOSPITAL | Age: 75
Discharge: HOME | End: 2025-01-27
Attending: NURSE PRACTITIONER
Payer: MEDICARE

## 2025-01-27 DIAGNOSIS — R05.3 CHRONIC COUGH: ICD-10-CM

## 2025-01-27 DIAGNOSIS — R05.3 CHRONIC COUGH: Primary | ICD-10-CM

## 2025-01-27 PROCEDURE — 94760 N-INVAS EAR/PLS OXIMETRY 1: CPT

## 2025-01-27 PROCEDURE — 94060 EVALUATION OF WHEEZING: CPT

## 2025-01-27 PROCEDURE — 71046 X-RAY EXAM CHEST 2 VIEWS: CPT | Mod: TC

## 2025-01-28 LAB
BRPFT: ABNORMAL
FEF 25 75 CHG: 28.4 %
FEF 25 75 LLN: 0.88
FEF 25 75 POST REF: 116.7 %
FEF 25 75 PRE REF: 90.9 %
FEF 25 75 REF: 2.28
FET100 CHG: -5.4 %
FEV1 CHG: 2.5 %
FEV1 FVC CHG: 3.6 %
FEV1 FVC LLN: 64
FEV1 FVC POST REF: 112.8 %
FEV1 FVC PRE REF: 108.9 %
FEV1 FVC REF: 78
FEV1 LLN: 1.25
FEV1 POST REF: 100.9 %
FEV1 PRE REF: 98.4 %
FEV1 REF: 1.75
FVC CHG: -1 %
FVC LLN: 1.61
FVC POST REF: 88.9 %
FVC PRE REF: 89.8 %
FVC REF: 2.25
PEF CHG: -23 %
PEF LLN: 3.14
PEF POST REF: 112.1 %
PEF PRE REF: 145.6 %
PEF REF: 4.59
POST FEF 25 75: 2.66 L/S (ref 0.88–3.68)
POST FET 100: 5.67 SEC
POST FEV1 FVC: 88.27 % (ref 64.25–90.38)
POST FEV1: 1.76 L (ref 1.25–2.23)
POST FVC: 2 L (ref 1.61–2.92)
POST PEF: 5.15 L/S (ref 3.14–6.04)
PRE FEF 25 75: 2.07 L/S (ref 0.88–3.68)
PRE FET 100: 5.99 SEC
PRE FEV1 FVC: 85.21 % (ref 64.25–90.38)
PRE FEV1: 1.72 L (ref 1.25–2.23)
PRE FVC: 2.02 L (ref 1.61–2.92)
PRE PEF: 6.68 L/S (ref 3.14–6.04)

## 2025-02-28 DIAGNOSIS — Z12.31 ENCOUNTER FOR SCREENING MAMMOGRAM FOR BREAST CANCER: Primary | ICD-10-CM

## 2025-03-18 ENCOUNTER — HOSPITAL ENCOUNTER (OUTPATIENT)
Dept: RADIOLOGY | Facility: HOSPITAL | Age: 75
Discharge: HOME | End: 2025-03-18
Attending: NURSE PRACTITIONER
Payer: MEDICARE

## 2025-03-18 VITALS — BODY MASS INDEX: 30.44 KG/M2 | WEIGHT: 151 LBS | HEIGHT: 59 IN

## 2025-03-18 DIAGNOSIS — Z12.31 ENCOUNTER FOR SCREENING MAMMOGRAM FOR BREAST CANCER: ICD-10-CM

## 2025-03-18 PROCEDURE — 77063 BREAST TOMOSYNTHESIS BI: CPT | Mod: TC

## 2025-04-16 ENCOUNTER — HOSPITAL ENCOUNTER (OUTPATIENT)
Dept: RADIOLOGY | Facility: HOSPITAL | Age: 75
Discharge: HOME | End: 2025-04-16
Attending: NURSE PRACTITIONER
Payer: MEDICARE

## 2025-04-16 DIAGNOSIS — R05.3 CHRONIC COUGH: ICD-10-CM

## 2025-04-16 PROCEDURE — 71250 CT THORAX DX C-: CPT | Mod: TC

## 2025-04-21 ENCOUNTER — LAB VISIT (OUTPATIENT)
Dept: LAB | Facility: HOSPITAL | Age: 75
End: 2025-04-21
Attending: CLINIC/CENTER
Payer: MEDICARE

## 2025-04-21 DIAGNOSIS — J31.0 CHRONIC RHINITIS: Primary | ICD-10-CM

## 2025-04-21 LAB — HOLD SPECIMEN: NORMAL

## 2025-04-21 PROCEDURE — 86003 ALLG SPEC IGE CRUDE XTRC EA: CPT

## 2025-04-21 PROCEDURE — 86003 ALLG SPEC IGE CRUDE XTRC EA: CPT | Mod: 59

## 2025-04-21 PROCEDURE — 82785 ASSAY OF IGE: CPT | Mod: 59

## 2025-04-21 PROCEDURE — 36415 COLL VENOUS BLD VENIPUNCTURE: CPT

## 2025-04-22 LAB — IGE SERPL-ACNC: <21 IU/ML

## 2025-04-24 LAB — FOOD ALLERG MIX5 IGE QN: <0.1 KU/L

## 2025-04-25 LAB
W ALLERGY INTERPRETATION: NORMAL
W ALTERNARIA ALTERNATA, CLASS: NORMAL
W ALTERNARIA ALTERNATA, IGE: <0.1 KU/L
W ASPERGILLUS FUMIGATUS, CLASS: NORMAL
W ASPERGILLUS FUMIGATUS, IGE: <0.1 KU/L
W BERMUDA GRASS, CLASS: NORMAL
W BERMUDA GRASS, IGE: <0.1 KU/L
W CAT DANDER, CLASS: NORMAL
W CAT DANDER, IGE: <0.1 KU/L
W CLADOSPORIUM HERBARUM, CLASS: NORMAL
W CLADOSPORIUM HERBARUM, IGE: <0.1 KU/L
W COCKROACH, GERMAN, CLASS: NORMAL
W COCKROACH, GERMAN, IGE: <0.1 KU/L
W COMMON PIGWEED, CLASS: NORMAL
W COMMON PIGWEED, IGE: <0.1 KU/L
W COMMON RAGWEED (SHORT), CLASS: NORMAL
W COMMON RAGWEED (SHORT), IGE: <0.1 KU/L
W COMMON SILVER BIRCH, CLASS: NORMAL
W COMMON SILVER BIRCH, IGE: <0.1 KU/L
W DERMATOPHAGOIDES FARINAE CLASS: NORMAL
W DERMATOPHAGOIDES FARINAE, IGE: <0.1 KU/L
W DERMATOPHAGOIDES PTERONYSSINUS CLASS: NORMAL
W DERMATOPHAGOIDES PTERONYSSINUS, IGE: <0.1 KU/L
W DOG DANDER, CLASS: NORMAL
W DOG DANDER, IGE: <0.1 KU/L
W ELM, CLASS: NORMAL
W ELM, IGE: <0.1 KU/L
W IGE: 43.2 IU/ML
W MAPLE (BOX ELDER), CLASS: NORMAL
W MAPLE (BOX ELDER), IGE: <0.1 KU/L
W MOUNTAIN JUNIPER CLASS: NORMAL
W MOUNTAIN JUNIPER, IGE: <0.1 KU/L
W MOUSE URINE PROTEINS CLASS: NORMAL
W MOUSE URINE PROTEINS, IGE: <0.1 KU/L
W MULBERRY, CLASS: NORMAL
W MULBERRY, IGE: <0.1 KU/L
W OAK, CLASS: NORMAL
W OAK, IGE: <0.1 KU/L
W PECAN, HICKORY, CLASS: NORMAL
W PECAN, HICKORY, IGE: <0.1 KU/L
W PENICILLIUM CHRYSOGENUM, CLASS: NORMAL
W PENICILLIUM CHRYSOGENUM, IGE: <0.1 KU/L
W ROUGH MARSHELDER, CLASS: NORMAL
W ROUGH MARSHELDER, IGE: <0.1 KU/L
W TIMOTHY GRASS, CLASS: NORMAL
W TIMOTHY GRASS, IGE: <0.1 KU/L
W WALNUT TREE, CLASS: NORMAL
W WALNUT TREE, IGE: <0.1 KU/L

## 2025-06-12 ENCOUNTER — HOSPITAL ENCOUNTER (OUTPATIENT)
Dept: RADIOLOGY | Facility: HOSPITAL | Age: 75
Discharge: HOME | End: 2025-06-12
Attending: PHYSICIAN ASSISTANT
Payer: MEDICARE

## 2025-06-12 ENCOUNTER — LAB VISIT (OUTPATIENT)
Dept: LAB | Facility: HOSPITAL | Age: 75
End: 2025-06-12
Attending: PHYSICIAN ASSISTANT
Payer: MEDICARE

## 2025-06-12 DIAGNOSIS — B18.1 CHRONIC HEPATITIS B: ICD-10-CM

## 2025-06-12 DIAGNOSIS — Z79.899 LONG-TERM USE OF HIGH-RISK MEDICATION: ICD-10-CM

## 2025-06-12 LAB
AFP SERPL-MCNC: <2 NG/ML
ALBUMIN SERPL BCP-MCNC: 3.9 G/DL (ref 3.5–5.2)
ALP SERPL-CCNC: 52 UNIT/L (ref 40–150)
ALT SERPL W/O P-5'-P-CCNC: 13 UNIT/L (ref 10–44)
ANION GAP (OHS): 9 MMOL/L (ref 8–16)
AST SERPL-CCNC: 20 UNIT/L (ref 11–45)
BILIRUB SERPL-MCNC: 0.7 MG/DL (ref 0.1–1)
BUN SERPL-MCNC: 19 MG/DL (ref 8–23)
CALCIUM SERPL-MCNC: 9.4 MG/DL (ref 8.7–10.5)
CHLORIDE SERPL-SCNC: 103 MMOL/L (ref 95–110)
CO2 SERPL-SCNC: 29 MMOL/L (ref 23–29)
CREAT SERPL-MCNC: 0.7 MG/DL (ref 0.5–1.4)
ERYTHROCYTE [DISTWIDTH] IN BLOOD BY AUTOMATED COUNT: 12.9 % (ref 11.5–14.5)
GFR SERPLBLD CREATININE-BSD FMLA CKD-EPI: >60 ML/MIN/1.73/M2
GLUCOSE SERPL-MCNC: 91 MG/DL (ref 70–110)
HBV CORE AB SERPL QL IA: REACTIVE
HBV SURFACE AB SER-ACNC: <3 MIU/ML
HBV SURFACE AB SERPL IA-ACNC: NORMAL M[IU]/ML
HBV SURFACE AG SERPL QL IA: REACTIVE
HBV SURFACE AG SERPLBLD QL IA.RAPID: ABNORMAL
HCT VFR BLD AUTO: 47.6 % (ref 37–48.5)
HGB BLD-MCNC: 15.6 GM/DL (ref 12–16)
INR PPP: 1 (ref 0.8–1.2)
MCH RBC QN AUTO: 30.4 PG (ref 27–31)
MCHC RBC AUTO-ENTMCNC: 32.8 G/DL (ref 32–36)
MCV RBC AUTO: 93 FL (ref 82–98)
PLATELET # BLD AUTO: 213 K/UL (ref 150–450)
PMV BLD AUTO: 9.9 FL (ref 9.2–12.9)
POTASSIUM SERPL-SCNC: 4 MMOL/L (ref 3.5–5.1)
PROT SERPL-MCNC: 7.2 GM/DL (ref 6–8.4)
PROTHROMBIN TIME: 11.6 SECONDS (ref 9–12.5)
RBC # BLD AUTO: 5.13 M/UL (ref 4–5.4)
SODIUM SERPL-SCNC: 141 MMOL/L (ref 136–145)
WBC # BLD AUTO: 6.75 K/UL (ref 3.9–12.7)

## 2025-06-12 PROCEDURE — 86706 HEP B SURFACE ANTIBODY: CPT | Mod: 59

## 2025-06-12 PROCEDURE — 87517 HEPATITIS B DNA QUANT: CPT

## 2025-06-12 PROCEDURE — 85610 PROTHROMBIN TIME: CPT

## 2025-06-12 PROCEDURE — 80053 COMPREHEN METABOLIC PANEL: CPT

## 2025-06-12 PROCEDURE — 36415 COLL VENOUS BLD VENIPUNCTURE: CPT

## 2025-06-12 PROCEDURE — 85027 COMPLETE CBC AUTOMATED: CPT

## 2025-06-12 PROCEDURE — 86704 HEP B CORE ANTIBODY TOTAL: CPT

## 2025-06-12 PROCEDURE — 82105 ALPHA-FETOPROTEIN SERUM: CPT

## 2025-06-12 PROCEDURE — 76705 ECHO EXAM OF ABDOMEN: CPT | Mod: TC

## 2025-06-12 PROCEDURE — 87340 HEPATITIS B SURFACE AG IA: CPT

## 2025-06-13 LAB — HBV DNA SERPL NAA+PROBE-ACNC: NORMAL [IU]/ML
